# Patient Record
Sex: MALE | Race: WHITE | NOT HISPANIC OR LATINO | URBAN - METROPOLITAN AREA
[De-identification: names, ages, dates, MRNs, and addresses within clinical notes are randomized per-mention and may not be internally consistent; named-entity substitution may affect disease eponyms.]

---

## 2019-04-05 ENCOUNTER — HOSPITAL ENCOUNTER (INPATIENT)
Facility: CLINIC | Age: 29
LOS: 17 days | Discharge: PSYCHIATRIC OR SUBSTANCE USE FACILITY | End: 2019-04-22
Attending: PSYCHIATRY & NEUROLOGY | Admitting: PSYCHIATRY & NEUROLOGY
Payer: COMMERCIAL

## 2019-04-05 ENCOUNTER — TRANSFERRED RECORDS (OUTPATIENT)
Dept: HEALTH INFORMATION MANAGEMENT | Facility: CLINIC | Age: 29
End: 2019-04-05

## 2019-04-05 DIAGNOSIS — F31.9 BIPOLAR I DISORDER (H): ICD-10-CM

## 2019-04-05 DIAGNOSIS — F41.9 ANXIETY: ICD-10-CM

## 2019-04-05 DIAGNOSIS — B19.20 HEPATITIS C VIRUS INFECTION WITHOUT HEPATIC COMA, UNSPECIFIED CHRONICITY: Primary | ICD-10-CM

## 2019-04-05 PROBLEM — F29 PSYCHOSIS (H): Status: ACTIVE | Noted: 2019-04-05

## 2019-04-05 PROCEDURE — 12400001 ZZH R&B MH UMMC

## 2019-04-05 RX ORDER — OLANZAPINE 10 MG/1
10 TABLET ORAL
Status: DISCONTINUED | OUTPATIENT
Start: 2019-04-05 | End: 2019-04-06

## 2019-04-05 RX ORDER — TRAZODONE HYDROCHLORIDE 50 MG/1
50 TABLET, FILM COATED ORAL
Status: DISCONTINUED | OUTPATIENT
Start: 2019-04-05 | End: 2019-04-22 | Stop reason: HOSPADM

## 2019-04-05 RX ORDER — ALUMINA, MAGNESIA, AND SIMETHICONE 2400; 2400; 240 MG/30ML; MG/30ML; MG/30ML
30 SUSPENSION ORAL EVERY 4 HOURS PRN
Status: DISCONTINUED | OUTPATIENT
Start: 2019-04-05 | End: 2019-04-22 | Stop reason: HOSPADM

## 2019-04-05 RX ORDER — POLYETHYLENE GLYCOL 3350 17 G
2-4 POWDER IN PACKET (EA) ORAL
Status: DISCONTINUED | OUTPATIENT
Start: 2019-04-05 | End: 2019-04-16 | Stop reason: ALTCHOICE

## 2019-04-05 RX ORDER — BISACODYL 10 MG
10 SUPPOSITORY, RECTAL RECTAL DAILY PRN
Status: DISCONTINUED | OUTPATIENT
Start: 2019-04-05 | End: 2019-04-22 | Stop reason: HOSPADM

## 2019-04-05 RX ORDER — ACETAMINOPHEN 325 MG/1
650 TABLET ORAL EVERY 4 HOURS PRN
Status: DISCONTINUED | OUTPATIENT
Start: 2019-04-05 | End: 2019-04-22 | Stop reason: HOSPADM

## 2019-04-05 RX ORDER — OLANZAPINE 10 MG/2ML
10 INJECTION, POWDER, FOR SOLUTION INTRAMUSCULAR
Status: DISCONTINUED | OUTPATIENT
Start: 2019-04-05 | End: 2019-04-06

## 2019-04-05 RX ORDER — HYDROXYZINE HYDROCHLORIDE 25 MG/1
25 TABLET, FILM COATED ORAL EVERY 4 HOURS PRN
Status: DISCONTINUED | OUTPATIENT
Start: 2019-04-05 | End: 2019-04-22 | Stop reason: HOSPADM

## 2019-04-05 ASSESSMENT — MIFFLIN-ST. JEOR: SCORE: 1712.42

## 2019-04-05 ASSESSMENT — ACTIVITIES OF DAILY LIVING (ADL)
HYGIENE/GROOMING: INDEPENDENT
ORAL_HYGIENE: INDEPENDENT
LAUNDRY: UNABLE TO COMPLETE
DRESS: INDEPENDENT

## 2019-04-06 PROCEDURE — 99221 1ST HOSP IP/OBS SF/LOW 40: CPT | Mod: AI | Performed by: CLINICAL NURSE SPECIALIST

## 2019-04-06 PROCEDURE — 99207 ZZC CDG-HISTORY COMP: MEETS EXP. PROBLEM FOCUSED-DOWN CODED LACK OF ROS: CPT | Performed by: CLINICAL NURSE SPECIALIST

## 2019-04-06 PROCEDURE — 12400001 ZZH R&B MH UMMC

## 2019-04-06 RX ORDER — OLANZAPINE 10 MG/2ML
10 INJECTION, POWDER, FOR SOLUTION INTRAMUSCULAR
Status: DISCONTINUED | OUTPATIENT
Start: 2019-04-06 | End: 2019-04-06

## 2019-04-06 RX ORDER — OLANZAPINE 5 MG/1
5-10 TABLET ORAL 3 TIMES DAILY PRN
Status: DISCONTINUED | OUTPATIENT
Start: 2019-04-06 | End: 2019-04-22 | Stop reason: HOSPADM

## 2019-04-06 RX ORDER — OLANZAPINE 10 MG/2ML
10 INJECTION, POWDER, FOR SOLUTION INTRAMUSCULAR 3 TIMES DAILY PRN
Status: DISCONTINUED | OUTPATIENT
Start: 2019-04-06 | End: 2019-04-22 | Stop reason: HOSPADM

## 2019-04-06 RX ORDER — OLANZAPINE 5 MG/1
5-10 TABLET ORAL 3 TIMES DAILY PRN
Status: DISCONTINUED | OUTPATIENT
Start: 2019-04-06 | End: 2019-04-06

## 2019-04-06 ASSESSMENT — ACTIVITIES OF DAILY LIVING (ADL)
LAUNDRY: UNABLE TO COMPLETE
ORAL_HYGIENE: INDEPENDENT
HYGIENE/GROOMING: INDEPENDENT
DRESS: INDEPENDENT

## 2019-04-06 NOTE — PLAN OF CARE
Tani has been sleeping this shift. This writer has attempted to speak with him but patient hasn't gotten out of bed. Tani did eat dinner and returned to bed.Denies SI/SIB/AH/VH. Just wants to sleep.

## 2019-04-06 NOTE — PROGRESS NOTES
04/05/19 2242   Patient Belongings   Did you bring any home meds/supplements to the hospital?  No   Patient Belongings locker   Patient Belongings Put in Hospital Secure Location (Security or Locker, etc.) clothing;shoes;other (see comments)   Second Staff Hany Montoya Health Insurance card and Flat-bill Cap     In Locker:    2 tops, 1 bottom, Sandalsa, Flat-bill hat and the pt's Health Insurance card    A               Admission:  I am responsible for any personal items that are not sent to the safe or pharmacy.  [facility] is not responsible for loss, theft or damage of any property in my possession.    Signature:  _________________________________ Date: _______  Time: _____                                              Staff Signature:  ____________________________ Date: ________  Time: _____      2nd Staff person, if patient is unable/unwilling to sign:    Signature: ________________________________ Date: ________  Time: _____     Discharge:  [facility] has returned all of my personal belongings:    Signature: _________________________________ Date: ________  Time: _____                                          Staff Signature:  ____________________________ Date: ________  Time: _____
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
## 2019-04-06 NOTE — PROGRESS NOTES
Initial Psychosocial Assessment    I have reviewed the chart, met with the patient, and developed Care Plan.  Information for assessment was obtained from: Medical Chart    Writer attempted to meet with patient at his bedside.  He declines to participate in interview saying he is too somnolent.  Information obtained from medical chart.      Presenting Problem:  Admitted on a 72 hour hold to [facility] on 4/5/19 due to sx of mauri  utox in ED POS for Amphetamine and Cannibis    History of Mental Health and Chemical Dependency:  hx of alcohol abuse, ADD, and depression     Family Description ([facility], Family Psychiatric History):  He was brought to the ED by his father    Significant Life Events (Illness, Abuse, Trauma, Death):  Not able to assess    Living Situation:  [facility] in [place]    Educational Background:  Not able to assess    Occupational History:  Not able to assess    Financial Status:  Income: Not able to assess  Insurance: Does not appear to have insurance    Legal Issues:  72 Hour Hold Begin Date: 4/5/2019    72 Hour Hold Begin Time: 7:30 PM    72 Hour Hold End Date: 4/10/2019    72 Hour Hold Time End: 7:30 PM      Ethnic/Cultural Issues:  28 year old  male    Spiritual Orientation:  Not able to assess     Service History:  Not able to assess    Social Functioning (organization, interests):  Not able to assess    Current Treatment Providers are:  Not able to assess    Social Service Assessment/Plan:  Patient will have psychiatric assessment and medication management by the psychiatrist. Medications will be reviewed and adjusted per MD as indicated. The treatment team will continue to assess and stabilize the patient's mental health symptoms with the use of medications and therapeutic programming. Hospital staff will provide a safe environment and a therapeutic milieu. Staff will continue to assess patient as needed. Patient will participate in unit groups and  activities. Patient will receive individual and group support on the unit.     CTC will do individual inpatient treatment planning and after care planning. CTC will discuss options for increasing community supports with the patient. CTC will coordinate with outpatient providers and will place referrals to ensure appropriate follow up care is in place.

## 2019-04-06 NOTE — PLAN OF CARE
"Pt presents to 30N with symptoms of psychosis. Patient has a known history of Alcohol Abuse, ADD and depression whom was brought into [facility] ED by his Father for a Mental Health Check. He reported at that time having suicidal thoughts and not wanting to live any longer. He denies this at this time and reports \"I only want to sleep.\" Tani reported he had one prior hospitalization for \"Mental illness twenty nine years ago.\" Urine tox screen positive for Amphetamines and Marijuana. Patient unable to cooperate with interview process at this time secondary to being unable to stay awake. Tani did report last using Meth a \"Few days ago.\"    Medical: Unremarkable    Plan: Provide for Safety,  Monitor and Observe Mood and Behavior, Encourage Medication compliance, Develop Trust.  "

## 2019-04-07 LAB
ALBUMIN SERPL-MCNC: 3.9 G/DL (ref 3.4–5)
ALP SERPL-CCNC: 58 U/L (ref 40–150)
ALT SERPL W P-5'-P-CCNC: 160 U/L (ref 0–70)
ANION GAP SERPL CALCULATED.3IONS-SCNC: 7 MMOL/L (ref 3–14)
AST SERPL W P-5'-P-CCNC: 50 U/L (ref 0–45)
BILIRUB SERPL-MCNC: 0.5 MG/DL (ref 0.2–1.3)
BUN SERPL-MCNC: 16 MG/DL (ref 7–30)
CALCIUM SERPL-MCNC: 9.2 MG/DL (ref 8.5–10.1)
CHLORIDE SERPL-SCNC: 104 MMOL/L (ref 94–109)
CO2 SERPL-SCNC: 30 MMOL/L (ref 20–32)
CREAT SERPL-MCNC: 0.84 MG/DL (ref 0.66–1.25)
ERYTHROCYTE [DISTWIDTH] IN BLOOD BY AUTOMATED COUNT: 12.9 % (ref 10–15)
GFR SERPL CREATININE-BSD FRML MDRD: >90 ML/MIN/{1.73_M2}
GLUCOSE SERPL-MCNC: 70 MG/DL (ref 70–99)
HCT VFR BLD AUTO: 51 % (ref 40–53)
HGB BLD-MCNC: 16.6 G/DL (ref 13.3–17.7)
MCH RBC QN AUTO: 31.8 PG (ref 26.5–33)
MCHC RBC AUTO-ENTMCNC: 32.5 G/DL (ref 31.5–36.5)
MCV RBC AUTO: 98 FL (ref 78–100)
PLATELET # BLD AUTO: 270 10E9/L (ref 150–450)
POTASSIUM SERPL-SCNC: 4.7 MMOL/L (ref 3.4–5.3)
PROT SERPL-MCNC: 7.6 G/DL (ref 6.8–8.8)
RBC # BLD AUTO: 5.22 10E12/L (ref 4.4–5.9)
SODIUM SERPL-SCNC: 141 MMOL/L (ref 133–144)
WBC # BLD AUTO: 7.8 10E9/L (ref 4–11)

## 2019-04-07 PROCEDURE — 0064U ANTB TP TOTAL&RPR IA QUAL: CPT | Performed by: PHYSICIAN ASSISTANT

## 2019-04-07 PROCEDURE — 87340 HEPATITIS B SURFACE AG IA: CPT | Performed by: PHYSICIAN ASSISTANT

## 2019-04-07 PROCEDURE — 36415 COLL VENOUS BLD VENIPUNCTURE: CPT | Performed by: PHYSICIAN ASSISTANT

## 2019-04-07 PROCEDURE — 99207 ZZC CDG-HISTORY COMP: MEETS EXP. PROBLEM FOCUSED - DOWN CODED LACK OF HPI: CPT | Performed by: PHYSICIAN ASSISTANT

## 2019-04-07 PROCEDURE — 87491 CHLMYD TRACH DNA AMP PROBE: CPT | Performed by: PHYSICIAN ASSISTANT

## 2019-04-07 PROCEDURE — 25000132 ZZH RX MED GY IP 250 OP 250 PS 637: Performed by: PSYCHIATRY & NEUROLOGY

## 2019-04-07 PROCEDURE — 12400001 ZZH R&B MH UMMC

## 2019-04-07 PROCEDURE — 87591 N.GONORRHOEAE DNA AMP PROB: CPT | Performed by: PHYSICIAN ASSISTANT

## 2019-04-07 PROCEDURE — 99231 SBSQ HOSP IP/OBS SF/LOW 25: CPT | Performed by: PHYSICIAN ASSISTANT

## 2019-04-07 PROCEDURE — 87522 HEPATITIS C REVRS TRNSCRPJ: CPT | Performed by: PHYSICIAN ASSISTANT

## 2019-04-07 PROCEDURE — 86706 HEP B SURFACE ANTIBODY: CPT | Performed by: PHYSICIAN ASSISTANT

## 2019-04-07 PROCEDURE — 87389 HIV-1 AG W/HIV-1&-2 AB AG IA: CPT | Performed by: PHYSICIAN ASSISTANT

## 2019-04-07 PROCEDURE — H2032 ACTIVITY THERAPY, PER 15 MIN: HCPCS

## 2019-04-07 PROCEDURE — 80053 COMPREHEN METABOLIC PANEL: CPT | Performed by: PHYSICIAN ASSISTANT

## 2019-04-07 PROCEDURE — 85027 COMPLETE CBC AUTOMATED: CPT | Performed by: PHYSICIAN ASSISTANT

## 2019-04-07 PROCEDURE — 86803 HEPATITIS C AB TEST: CPT | Performed by: PHYSICIAN ASSISTANT

## 2019-04-07 RX ADMIN — NICOTINE POLACRILEX 2 MG: 2 LOZENGE ORAL at 09:51

## 2019-04-07 RX ADMIN — NICOTINE POLACRILEX 4 MG: 2 LOZENGE ORAL at 21:19

## 2019-04-07 ASSESSMENT — ACTIVITIES OF DAILY LIVING (ADL)
HYGIENE/GROOMING: HANDWASHING;SHOWER;INDEPENDENT
DRESS: SCRUBS (BEHAVIORAL HEALTH);INDEPENDENT
HYGIENE/GROOMING: INDEPENDENT
LAUNDRY: WITH SUPERVISION
LAUNDRY: WITH SUPERVISION
DRESS: INDEPENDENT
ORAL_HYGIENE: INDEPENDENT
ORAL_HYGIENE: INDEPENDENT

## 2019-04-07 NOTE — CONSULTS
[facility], [place]  Consult Note - Hospitalist Service       Date of Admission: 4/5/2019  Consult Requested by: Debra Naegele  Reason for Consult: Admit from OSH    Assessment & Plan   Tani Ramirez is a 28 year old male with a history of unspecified psychosis, ADD, generalized anxiety disorder, and polysubstance abuse who was admitted to station 30 from OSH on 4/5/19 with psychosis.    Psychosis, Polysubstance Abuse, JESS, ADD - Not on any PTA medications. Hx of alcohol and methamphetamine abuse per chart review, however, patient denies recent use of alcohol or illicit drugs on today's exam. Utox from OSH + for amphetamines and THC on 4/5/19. BAL from OSH <3 on 4/5/19.  - Management per Psychiatry.     Mild Transaminitis - , AST 50 on 4/7/19. TBili and Alk Phos wnl. No prior values available for comparison. Asymptomatic. Unclear etiology, possibly related to hx of alcohol abuse though not elevated in typical 2:1 pattern. DDx includes viral hepatitis in setting of substance abuse.  - Screen for Hepatitis B and C as below  - Trend Hepatic Panel on 4/8    STI Testing - Notified by RN staff that patient requesting STI testing.  - HIV, Hepatitis B, Hepatitis C, Treponema Ab, and Urine G/C PCR ordered.    Medicine will follow up on results of STI testing and repeat hepatic panel. Please page the on-call [facility] for any intercurrent medical issues which arise.    Mercedes Pichardo PA-C  Hospitalist Service  [phone]  _________________________________________________________________    Chief Complaint   Psychosis    History is obtained from the patient and electronic health record    History of Present Illness   Tani Ramirez is a 28 year old male with a history of unspecified psychosis, ADD, generalized anxiety disorder, and polysubstance abuse who was admitted to station 30 from OSH on 4/5/19 with psychosis. History limited as patient is a poor historian. He reports his only current  "concern is when he is going to \"get out of here.\" Chart review indicates a history of alcohol abuse and methamphetamine abuse, however, patient denies recent use of alcohol or illicit drugs on today's interview. Denies any medical complaints including headaches, dizziness, URI symptoms, chest pain, SOB, n/v/c/d, abdominal pain, or dysuria.    Review of Systems   The 10 point Review of Systems is negative other than noted in the HPI or here.     Past Medical History    I have reviewed this patient's medical history and updated it with pertinent information if needed.   Past Medical History:   Diagnosis Date     ADD (attention deficit disorder)      Generalized anxiety disorder      Polysubstance abuse (H)     Alcohol, Methamphetamines     Psychosis, unspecified psychosis type (H)        Past Surgical History   I have reviewed this patient's surgical history and updated it with pertinent information if needed.  Past Surgical History:   Procedure Laterality Date     LAPAROSCOPIC PYLOROMYOTOMY CHILD         Social History   Daily nicotine use via eJuice. Requesting nicotine lozenges.  Reported hx of alcohol and methamphetamine abuse per chart review. Patient denies recent use of alcohol or illicit drugs on today's exam.   Utox from OSH + for amphetamines and THC. BAL from OSH <3.    Family History   Reviewed and unknown to patient.    Medications   No medications prior to admission.       Allergies   No Known Allergies    Physical Exam   Vital Signs: Temp: 98.3  F (36.8  C) Temp src: Oral BP: 96/56 Pulse: 70            Weight: 161 lbs 0 oz     General: Lying in bed. Appears comfortable and non-toxic. NAD.  CV: RRR. No appreciable murmurs.  Respiratory: Normal effort on RA. Lungs CTAB without wheezes, rales, or rhonchi.  GI: Soft, non-distended, and non-tender with bowel sounds present.  Extremities: No peripheral edema. Warm and well perfused.  Neuro: Alert. Moves all extremities. Grossly non-focal.  Skin: No rashes or " jaundice on exposed areas of skin.    Data    CMP from 4/7/19 significant for , AST 50.  CBC WNL on 4/7/19.  Utox from OSH on 4/5/19 positive for amphetaines and THC.   BAL from OSH on 4/5/19 <3.

## 2019-04-07 NOTE — PROGRESS NOTES
patient spent most of the shift in his room, either sleeping or resting. He denies any SI or SIB, as well as insisting he needs to leave the hospital soon. That said, his thinking appears quite paranoid, with the patient thinking staff and other patient's are mentioning other medications and keeping his blood type from him for a suspicious reason.       04/07/19 1500   Behavioral Health   Hallucinations denies / not responding to hallucinations   Thinking paranoid;delusional   Orientation person: oriented;place: oriented;date: oriented;time: oriented   Memory baseline memory   Insight poor   Judgement impaired   Eye Contact at examiner   Affect full range affect   Mood anxious;mood is calm   Physical Appearance/Attire appears stated age;attire appropriate to age and situation   Hygiene well groomed   1. Wish to be Dead No   2. Non-Specific Active Suicidal Thoughts  No   Activity withdrawn   Speech clear;coherent   Medication Sensitivity no stated side effects;no observed side effects   Psychomotor / Gait balanced;steady   Psycho Education   Type of Intervention 1:1 intervention   Response participates, initiates socially appropriate   Hours 0.5   Activities of Daily Living   Hygiene/Grooming independent   Oral Hygiene independent   Dress independent   Laundry with supervision   Room Organization independent

## 2019-04-08 LAB
C TRACH DNA SPEC QL NAA+PROBE: NEGATIVE
HBV SURFACE AB SERPL IA-ACNC: 7.35 M[IU]/ML
HBV SURFACE AG SERPL QL IA: NONREACTIVE
HCV AB SERPL QL IA: REACTIVE
HIV 1+2 AB+HIV1 P24 AG SERPL QL IA: NONREACTIVE
N GONORRHOEA DNA SPEC QL NAA+PROBE: NEGATIVE
SPECIMEN SOURCE: NORMAL
SPECIMEN SOURCE: NORMAL
T PALLIDUM AB SER QL: NONREACTIVE

## 2019-04-08 PROCEDURE — 25000132 ZZH RX MED GY IP 250 OP 250 PS 637: Performed by: PSYCHIATRY & NEUROLOGY

## 2019-04-08 PROCEDURE — 12400001 ZZH R&B MH UMMC

## 2019-04-08 PROCEDURE — 99232 SBSQ HOSP IP/OBS MODERATE 35: CPT | Performed by: PSYCHIATRY & NEUROLOGY

## 2019-04-08 RX ORDER — GABAPENTIN 100 MG/1
100 CAPSULE ORAL 3 TIMES DAILY
Status: DISCONTINUED | OUTPATIENT
Start: 2019-04-08 | End: 2019-04-22 | Stop reason: HOSPADM

## 2019-04-08 RX ADMIN — GABAPENTIN 100 MG: 100 CAPSULE ORAL at 17:48

## 2019-04-08 RX ADMIN — NICOTINE POLACRILEX 4 MG: 2 LOZENGE ORAL at 14:29

## 2019-04-08 ASSESSMENT — ACTIVITIES OF DAILY LIVING (ADL)
HYGIENE/GROOMING: INDEPENDENT
DRESS: INDEPENDENT
ORAL_HYGIENE: INDEPENDENT

## 2019-04-08 NOTE — PROGRESS NOTES
Brief medicine follow up note:  - Met with pt today to discuss positive hepatitis C anne. Denies hx of Hep C and denies prior exposure but states he has hx of IVDU but not for some time. Thinks his GF still uses IV drugs. Denies fever, chills, abd pain, nausea. Explained to him that he needs follow up confirmatory testing and he is agreeable to obtain this today. If + for viral load, discussed the importance of him needing OP GI follow up in clinic for further testing and to discuss potential treatment. Medicine will follow up on today's HCV RNA quant. Please feel free to call with questions.       Govind Augustin PA-C  Internal Medicine Hospitalist   [facility] Hospitalist group  [phone]

## 2019-04-08 NOTE — PROGRESS NOTES
Team has decided to petition for MI-CD commitment.  I called [facility] Prepetition Screening - Ph: [phone] Fax: [phone]  And they will assign the case.  I stated the paperwork will be ready by tomorrow morning.

## 2019-04-08 NOTE — PROGRESS NOTES
Pt was active in milieu and appropriate with staff and peers. Pt reported feeling depressed yesterday but feels great today. Pt denies SI, SIB, and all other psychotic symptoms.          04/07/19 2247   Behavioral Health   Hallucinations denies / not responding to hallucinations   Thinking distractable   Orientation person: oriented;place: oriented;date: oriented;time: oriented   Memory baseline memory   Insight admits / accepts   Judgement intact   Eye Contact at examiner   Affect blunted, flat   Mood mood is calm   Physical Appearance/Attire attire appropriate to age and situation   Hygiene well groomed   Suicidality other (see comments)  (Denies)   1. Wish to be Dead No   2. Non-Specific Active Suicidal Thoughts  No   3. Active Sucidal Ideation with any Methods (Not Plan) Without Intent to Act  No   4. Active Suicidal Ideation with Some Intent to Act, Without Specific Plan  No   5. Active Suicidal Ideation with Specific Plan and Intent  No   Self Injury other (see comment)  (None Observed)   Activity other (see comment)  (Active in milieu)   Speech clear;coherent   Medication Sensitivity no stated side effects;no observed side effects   Psychomotor / Gait balanced;steady   Coping/Psychosocial   Verbalized Emotional State acceptance   Activities of Daily Living   Hygiene/Grooming handwashing;shower;independent   Oral Hygiene independent   Dress scrubs (behavioral health);independent   Laundry with supervision   Room Organization independent

## 2019-04-08 NOTE — PLAN OF CARE
"Tani father stopped at the desk and wishes to inform staff that \"Tani is saying some really strange things.\" Dad stated \"he has been calling home over the weekend to my wife and I and is making crazy statements about \"[employer]\". Dad would like to speak to the physician. Dad and his wife \"Don't want to take him home as he is right now.\" Dad is not convinced that Tani is that much better from when he took him into the ER in [place].  MAHI for Father (Tom Powers) signed by patient.   "
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
## 2019-04-09 PROCEDURE — 12400001 ZZH R&B MH UMMC

## 2019-04-09 PROCEDURE — 99232 SBSQ HOSP IP/OBS MODERATE 35: CPT | Performed by: PSYCHIATRY & NEUROLOGY

## 2019-04-09 PROCEDURE — 25000132 ZZH RX MED GY IP 250 OP 250 PS 637: Performed by: PSYCHIATRY & NEUROLOGY

## 2019-04-09 RX ADMIN — NICOTINE POLACRILEX 4 MG: 2 LOZENGE ORAL at 11:03

## 2019-04-09 RX ADMIN — GABAPENTIN 100 MG: 100 CAPSULE ORAL at 20:00

## 2019-04-09 RX ADMIN — GABAPENTIN 100 MG: 100 CAPSULE ORAL at 14:01

## 2019-04-09 RX ADMIN — GABAPENTIN 100 MG: 100 CAPSULE ORAL at 08:49

## 2019-04-09 ASSESSMENT — MIFFLIN-ST. JEOR: SCORE: 1726.03

## 2019-04-09 NOTE — PROGRESS NOTES
Pt was in a good mood this evening. He spent his time watching movies and socializing with peers. He was calm and respectful. He admits that he was having delusions when he was admitted to the hospital, though he doesn't feel like he needs to be here. He denies any anxiety, depression, SI, SIB, or hallucinations. His goal was to call his parents so he can get help straightening out the story of how he was admitted. He doesn't feel like the medication he was given here has done anything for him except to make him sleepy.     04/08/19 2200   Behavioral Health   Hallucinations denies / not responding to hallucinations   Thinking distractable   Orientation time: oriented;date: oriented;person: oriented;place: oriented   Memory baseline memory   Insight admits / accepts   Judgement intact   Eye Contact at examiner   Affect full range affect   Mood mood is calm   Physical Appearance/Attire appears stated age;attire appropriate to age and situation   Hygiene well groomed   Suicidality other (see comments)  (denies)   1. Wish to be Dead No   2. Non-Specific Active Suicidal Thoughts  No   Self Injury other (see comment)  (denies)   Elopement   (none)   Activity other (see comment)  (visible in milieu, social with peers)   Speech clear;coherent   Medication Sensitivity no stated side effects;no observed side effects   Psychomotor / Gait balanced;steady   Activities of Daily Living   Hygiene/Grooming independent   Oral Hygiene independent   Dress independent   Room Organization independent

## 2019-04-09 NOTE — PROGRESS NOTES
[facility], [place]   Psychiatric Progress Note        Interim History:   The patient's care was discussed with the treatment team during the daily team meeting and/or staff's chart notes were reviewed.  Staff report: No acute issues.  Slept well.  Eating meals.  Taking medications. No aggression or hostility.    On examination today, the patient had no complaints other than feeling confined on the unit.  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  He feels as though continuing his hospital stay is simply wasting time.  He has had some time to think about how to be successful with his goals which he explains to me today.  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  Afterwards, he can find employment and hopefully save enough money for independent housing.    He reflected on knowing a person who is receiving $800 a month through disability benefits for diagnosis of bipolar disorder.  He is saddened to see that person spend money on drugs and gambling.  He explains that if he were to have a stable income similar to that, he could be much more productive with the way he spends the money and use it to establish independence and stable housing.  He questions whether he may have a diagnosis of bipolar disorder.  He tells me that his mother has that diagnosis and he has noticed occasional instability in his mood.  He inquired as to how he may go about applying for disability or being assessed for that diagnosis.    Patient denied auditory and visual hallucinations.  He slept well last night and did not report vegetative symptoms.  He denied feeling helpless or hopeless.  Denied anhedonia.    No side effects reported to gabapentin.    Overall he seemed more organized and future oriented today.  He did not express any paranoid concerns.            "Medications:       gabapentin  100 mg Oral TID          Allergies:   No Known Allergies       Labs:   No results found for this or any previous visit (from the past 24 hour(s)).       Psychiatric Examination:     /70 (BP Location: Left arm)   Pulse 85   Temp 96.7  F (35.9  C) (Tympanic)   Resp 16   Ht 1.803 m (5' 11\")   Wt 74.4 kg (164 lb)   BMI 22.87 kg/m    Weight is 164 lbs 0 oz  Body mass index is 22.87 kg/m .  Orthostatic Vitals       Most Recent      Sitting Orthostatic /81 04/07 1616    Sitting Orthostatic Pulse (bpm) 83 04/07 1616            Appearance: awake, alert  Attitude:  cooperative  Eye Contact:  better  Mood:  better  Affect:  appropriate and in normal range  Speech:  clear, coherent  Psychomotor Behavior:  no evidence of tardive dyskinesia, dystonia, or tics  Throught Process:  logical and linear  Associations:  no loose associations  Thought Content:  no evidence of suicidal ideation or homicidal ideation and no evidence of psychotic thought  Insight:  limited  Judgement:  fair  Oriented to:  time, person, and place  Attention Span and Concentration:  intact  Recent and Remote Memory:  fair    Clinical Global Impressions  First:  Considering your total clinical experience with this particular patient population, how severe are the patient's symptoms at this time?: 7 (04/08/19 1253)  Compared to the patient's condition at the START of treatment, this patient's condition is:: 3 (04/08/19 1253)  Most recent:  Considering your total clinical experience with this particular patient population, how severe are the patient's symptoms at this time?: 7 (04/08/19 1253)  Compared to the patient's condition at the START of treatment, this patient's condition is:: 3 (04/08/19 1253)         Precautions:     Behavioral Orders   Procedures     Code 1 - Restrict to Unit     [place]     MMPI 2     Routine Programming     As clinically indicated     Status 15     Every 15 minutes.     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  His presentation seems to reflect a substance-induced psychotic disorder stemming from methamphetamine and/or cannabis use.  Unfortunately, he has not interested in formal treatment however is beginning to appreciate the need for sobriety.  Continue to utilize motivational interviewing in hopes of gaining cooperation with a referral for MI CD treatment.    Hepatitis C: New diagnosis.  He met with internal medicine yesterday and they outlined a plan to pursue further evaluation and treatment however he would need 60 days of sobriety before qualifying for treatment.  The patient is aware of this which seem to motivate his desire for sobriety.    Psychosocial treatments to be addressed with social work consult and groups.  As previously noted, I spoke with his father yesterday but expressed safety concerns in the context of the risk of a relapse outside of a structured setting.    Psychology consultation requested today for neuropsychological testing to further evaluate personality characteristics and the possibility of a bipolar disorder.    Legal status: On a 72-hour hold.  Petition for commitment was submitted yesterday and we are awaiting prepetition screening.    Disposition: To be determined.  The patient is refusing residential treatment options.  His father tells me that he is not welcome back home unless he completes treatment  first to ensure sobriety.

## 2019-04-09 NOTE — PHARMACY-ADMISSION MEDICATION HISTORY
Admission Medication History status for the 4/5/2019 admission is complete.  See EPIC admission navigator for Prior to Admission medications.    Medication history interview sources:  Patient     Medication history source reliability: Good    Patient reports taking no prescription or over-the-counter medications    Medication history completed by: Chris Keen, Pharmacy Intern    Prior to Admission medications    Not on File

## 2019-04-09 NOTE — PROGRESS NOTES
"  I have faxed the petition paperwork to [facility].  Pt is assigned to Waylon HAGAN @ [phone]. I had not heard from her but did leave her a vm asking when she would be here.    Pt approached the desk and seem to be doing an MMPI. He asked me what \" I usually get a raw deal in life\" meant.  I explained and he seemed to understand.    Pt was seen by Waylon late afternoon for the screening.  "

## 2019-04-09 NOTE — PLAN OF CARE
Patient is calm and speaks clearly. Patient does not mention any drug use connected with his delusional thinking. States that he feels that  Neurontin is helping with his anxiety. Denies any suicidal ideation. Worked on MMPI for the majority of the shift.

## 2019-04-10 PROCEDURE — 99232 SBSQ HOSP IP/OBS MODERATE 35: CPT | Performed by: PSYCHIATRY & NEUROLOGY

## 2019-04-10 PROCEDURE — 12400001 ZZH R&B MH UMMC

## 2019-04-10 PROCEDURE — 25000132 ZZH RX MED GY IP 250 OP 250 PS 637: Performed by: PSYCHIATRY & NEUROLOGY

## 2019-04-10 RX ADMIN — GABAPENTIN 100 MG: 100 CAPSULE ORAL at 14:31

## 2019-04-10 RX ADMIN — NICOTINE POLACRILEX 4 MG: 2 LOZENGE ORAL at 09:18

## 2019-04-10 RX ADMIN — GABAPENTIN 100 MG: 100 CAPSULE ORAL at 08:42

## 2019-04-10 RX ADMIN — GABAPENTIN 100 MG: 100 CAPSULE ORAL at 20:34

## 2019-04-10 ASSESSMENT — ACTIVITIES OF DAILY LIVING (ADL)
HYGIENE/GROOMING: INDEPENDENT
HYGIENE/GROOMING: INDEPENDENT
DRESS: INDEPENDENT
LAUNDRY: WITH SUPERVISION
ORAL_HYGIENE: INDEPENDENT
DRESS: INDEPENDENT
ORAL_HYGIENE: INDEPENDENT

## 2019-04-10 NOTE — PROGRESS NOTES
"Patient intermittently visible and active in the milieu today.  Peer interaction spontaneous and respectful.  No observed program involvement.  Presented generally as alert, calm, focused, and coherent.  Affect full range and stable. Mood stable.  Responsive to staff inquiries and cooperative with interventions and protocols.  Endorsed moderate to high \"situational\" anxiety (concern about his job status and girlfriend), but denied depression, SI, SIB urges, AH's, and cognitive slowing, concentration issues, CD issues, etc.   Patient completed some neuropsych testing this AM in preparation for potential commitment proceedings.  Patient is hopeful of stabilizing here in the hospital and being released to his parents home where he would then work on his career development.  Presented o behavioral management issues this shift.   Jamie Hogan   04/10/2019     04/10/19 1208   Sleep/Rest/Relaxation   Day/Evening Time Hours resting in bed   Cognitive   Follows Commands yes   Speech clear;spontaneous   Best Language 0 - No aphasia   Mood/Behavior anxious   Behavioral Health   Hallucinations denies / not responding to hallucinations   Thinking intact   Orientation person: oriented;place: oriented;date: oriented;time: oriented   Memory baseline memory   Insight admits / accepts;insight appropriate to situation   Judgement impaired   Eye Contact at examiner   Affect full range affect   Mood anxious   Physical Appearance/Attire appears stated age;attire appropriate to age and situation;neat   Hygiene well groomed   Suicidality other (see comments)  (denied SI)   1. Wish to be Dead No   2. Non-Specific Active Suicidal Thoughts  No   Self Injury other (see comment)  (denied SIB urges)   Elopement   (no indicators)   Activity other (see comment)  (intermittently visible and engaged in milieu)   Speech clear;coherent   Psychomotor / Gait balanced;steady   Psycho Education   Type of Intervention 1:1 intervention   Response " participates with encouragement   Hours 0.5   Treatment Detail current MH status   Safety   Suicidality Status 15   Violence Risk   Feels Like Hurting Others no   Activities of Daily Living   Hygiene/Grooming independent   Oral Hygiene independent   Dress independent   Room Organization independent   Groups   Details no group engagement

## 2019-04-10 NOTE — PROGRESS NOTES
Waylon ([phone]) from [facility] called and left a vm. She said that they supported the petition but only the MI part, they did not support the CD portion.    We can expect a court hold by the end of the day.    Jena from [facility] called to say that the pt is on a court hold as of 4:02PM. She is faxing the hard copy.  I asked MD to place order for the court hold. I informed RN.
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
## 2019-04-10 NOTE — PROGRESS NOTES
"Pt was visible and social within the milieu and spent a good amount of time in his room talking to his roommate. At one point they were shouting and possibly arguing. When this writer checked in and asked if everything was ok, both pts responded, \"yes\". Pt attended AA meeting and did shower this shift. Upon check in, pt appeared very nervous and stated he was hoping he did well on his test(mmpi) as he believes that is going to be the deciding factor in his discharge. Pt rated his depression at 0 and anxiety at a 7.Pt stated his ideal discharge is to return to his parents home, finish his last semester of college and get his license back. Pt also states he is worried about a girl he recently met that uses meth and has her own home. Pt stated he can be there for her since he will not use and she respects him because he wont allow her to shoot up. Pt did not appear to have any insight into his substance issues and believes his major problems are due to life circumstances and he should just go back to the life he lived prior. Pt also stated he is nervous about his diagnosis of HEP C and is still processing the new information.      04/09/19 2200   Behavioral Health   Hallucinations denies / not responding to hallucinations   Thinking intact   Orientation person: oriented;place: oriented;date: oriented;time: oriented   Memory baseline memory   Insight poor   Judgement impaired   Affect blunted, flat   Mood mood is calm   Physical Appearance/Attire attire appropriate to age and situation   Hygiene well groomed   1. Wish to be Dead No   2. Non-Specific Active Suicidal Thoughts  No     "

## 2019-04-11 LAB
HCV RNA SERPL NAA+PROBE-ACNC: ABNORMAL [IU]/ML
HCV RNA SERPL NAA+PROBE-LOG IU: 4.5 LOG IU/ML

## 2019-04-11 PROCEDURE — 99232 SBSQ HOSP IP/OBS MODERATE 35: CPT | Performed by: PSYCHIATRY & NEUROLOGY

## 2019-04-11 PROCEDURE — 25000132 ZZH RX MED GY IP 250 OP 250 PS 637: Performed by: PSYCHIATRY & NEUROLOGY

## 2019-04-11 PROCEDURE — 12400001 ZZH R&B MH UMMC

## 2019-04-11 RX ORDER — ARIPIPRAZOLE 5 MG/1
5 TABLET ORAL DAILY
Status: DISCONTINUED | OUTPATIENT
Start: 2019-04-11 | End: 2019-04-18

## 2019-04-11 RX ADMIN — NICOTINE POLACRILEX 4 MG: 2 LOZENGE ORAL at 21:01

## 2019-04-11 RX ADMIN — GABAPENTIN 100 MG: 100 CAPSULE ORAL at 08:44

## 2019-04-11 RX ADMIN — TRAZODONE HYDROCHLORIDE 50 MG: 50 TABLET ORAL at 22:55

## 2019-04-11 RX ADMIN — GABAPENTIN 100 MG: 100 CAPSULE ORAL at 14:04

## 2019-04-11 RX ADMIN — ARIPIPRAZOLE 5 MG: 5 TABLET ORAL at 14:04

## 2019-04-11 RX ADMIN — GABAPENTIN 100 MG: 100 CAPSULE ORAL at 21:00

## 2019-04-11 RX ADMIN — NICOTINE POLACRILEX 4 MG: 2 LOZENGE ORAL at 14:05

## 2019-04-11 ASSESSMENT — ACTIVITIES OF DAILY LIVING (ADL)
ORAL_HYGIENE: INDEPENDENT
HYGIENE/GROOMING: INDEPENDENT
DRESS: INDEPENDENT
ORAL_HYGIENE: INDEPENDENT
HYGIENE/GROOMING: HANDWASHING;SHOWER;INDEPENDENT
LAUNDRY: WITH SUPERVISION
DRESS: STREET CLOTHES;SCRUBS (BEHAVIORAL HEALTH);INDEPENDENT

## 2019-04-11 ASSESSMENT — MIFFLIN-ST. JEOR: SCORE: 1735.1

## 2019-04-11 NOTE — PROGRESS NOTES
Pt was served papers from the court today. I understand that he appeared surprised.    Since pt was not able to engage in a psychosocial assessment, it would behoove staff to read the prepetition report to understand this pt further.    Exam: April 15, 2019 at 3:30PM.  Hearing: April 18, 2019 - time TBD    Attoney: Dong Drake @[phone]

## 2019-04-11 NOTE — PROGRESS NOTES
[facility], [place]   Psychiatric Progress Note        Interim History:   The patient's care was discussed with the treatment team during the daily team meeting and/or staff's chart notes were reviewed.  Staff report: No acute issues.  Slept well.  Eating meals.  Taking medications. No aggression or hostility.    He reports completing the neuropsych testing.  He inquired as to why certain questions were asked and why he was asked to draw certain types of pictures.  He inquired regarding what the testing might be used for.  He has questions regarding what to expect next regarding his treatment.  As we discussed the decision for a court hold, the seem to prompt some agitation.  He inquired regarding the examination process which seemed to trigger some underlying paranoia.  He seemed to have difficulty accepting explanation.  Angered by the idea of a  being involved.  Angered by the idea of a  being involved.  In general, the entire examination portion seem to prompt a lot of paranoid concerns and anger.  In frustration, he terminated the interview.  I did overhear him in the pratt angrily yelling and cursing regarding the need to continue his hospitalization and be evaluated at a court hearing.    Prior to becoming upset, he did mention that he has noticed his mood fluctuates throughout the day.  He described moments of sadness, moments of irritability, and moments of happiness which seem to be unrelated to environmental triggers.    Patient denied auditory and visual hallucinations.  He slept well last night and did not report vegetative symptoms.  He denied feeling helpless or hopeless. Denied anhedonia.    No side effects reported to gabapentin.           Medications:       gabapentin  100 mg Oral TID          Allergies:   No Known Allergies       Labs:   No results found for this or any previous visit (from the past 24 hour(s)).       Psychiatric Examination:     /87    "Pulse 80   Temp 97.6  F (36.4  C) (Oral)   Resp 16   Ht 1.803 m (5' 11\")   Wt 74.4 kg (164 lb)   SpO2 97%   BMI 22.87 kg/m    Weight is 164 lbs 0 oz  Body mass index is 22.87 kg/m .  Orthostatic Vitals       Most Recent      Sitting Orthostatic /81 04/07 1616    Sitting Orthostatic Pulse (bpm) 83 04/07 1616            Appearance: awake, alert  Attitude:  cooperative  Eye Contact:  better  Mood:  angry and anxious  Affect:  labile and guarded  Speech:  pressured speech  Psychomotor Behavior:  no evidence of tardive dyskinesia, dystonia, or tics  Throught Process:  tangental  Associations:  no loose associations  Thought Content:  no evidence of suicidal ideation or homicidal ideation and There seemed to be a prominent theme of paranoid concerns.  Insight:  limited  Judgement:  limited  Oriented to:  time, person, and place  Attention Span and Concentration:  intact  Recent and Remote Memory:  fair    Clinical Global Impressions  First:  Considering your total clinical experience with this particular patient population, how severe are the patient's symptoms at this time?: 7 (04/08/19 1253)  Compared to the patient's condition at the START of treatment, this patient's condition is:: 3 (04/08/19 1253)  Most recent:  Considering your total clinical experience with this particular patient population, how severe are the patient's symptoms at this time?: 7 (04/08/19 1253)  Compared to the patient's condition at the START of treatment, this patient's condition is:: 3 (04/08/19 1253)         Precautions:     Behavioral Orders   Procedures     Code 1 - Restrict to Unit     [facility]     MMPI 2     Routine Programming     As clinically indicated     Status 15     Every 15 minutes.     Suicide precautions     Patients on Suicide Precautions should have a Combination Diet ordered that includes a Diet selection(s) AND a Behavioral Tray selection for Safe Tray - with utensils, or Safe Tray - NO utensils            " DIagnoses:      Suspect a substance-induced psychotic disorder (Methamphetamine and/or cannabis related)    -Rule out a primary psychotic illness or mood disorder  Unspecified anxiety disorder  Stimulant use disorder, amphetamine type substance, severe  Cannabis use disorder, moderate         Plan:     Mood symptoms may be coming more apparent.  Paranoia persist today and moderately worse than yesterday.  We will discuss treatment with a mood stabilizer in more depth tomorrow.  His anger regarding the petition process limited the interview today.    Hepatitis C: New diagnosis.  Awaiting confirmatory results.  Plan for outpatient follow-up.    Psychosocial treatments to be addressed with social work consult and groups.    Psychology consultation appreciated and results were reviewed today.  I will plan to review these results with the patient tomorrow.  Testing raises suspicion for the presence of an underlying mood disorder such as bipolar disorder or schizoaffective disorder.    Legal status: Now on a court hold for treatment of mental illness as we await court examination.    Disposition: To be determined.  The patient is refusing residential treatment options.  His father tells me that he is not welcome back home unless he completes treatment first to ensure sobriety.

## 2019-04-11 NOTE — PROGRESS NOTES
"Tani was initially very angry and upset at the beginning of the shift as he found out he wouldn't be discharged today. Iis very upset that \"My Mom is giving you information and I didn't fill out an MAHI for her.\"  MAHI is filled out for Father. Tani was able to calm himself down and was much better mood wise by the end of the shift.  "

## 2019-04-11 NOTE — PLAN OF CARE
Pt in room most of shift. Pt frustrated, angry about petition process and being forced to stay in the hospital. Pt available to processing frustration, discussing court process. Pt voices in agreement that he does want treatment for bipolar disorder. Is in agreement to start medication.  Again voices that he does not want to be forced to go to CD tx. Pt states he is upset that being in hospital may cost him a potential job, girlfriend.

## 2019-04-11 NOTE — PROGRESS NOTES
"[facility], [place]   Psychiatric Progress Note        Interim History:   The patient's care was discussed with the treatment team during the daily team meeting and/or staff's chart notes were reviewed.  Staff report: No acute issues.  Slept well.  Eating meals.  Taking medications. No aggression or hostility.    Seemed a bit frustrated after he received the court documentation today.  Shortly afterwards, he approached for a meeting hoping to have questions answered.  He seemed to respond well to our meeting    We reviewed the results of the neuropsychological testing.  We discussed his diagnosis and treatment plan.  He seemed agreeable for these interventions and seemed accepting of the diagnosis.    Patient denied auditory and visual hallucinations.  He slept well last night and did not report vegetative symptoms.  He denied feeling helpless or hopeless. Denied anhedonia.    No side effects reported to gabapentin.           Medications:       ARIPiprazole  5 mg Oral Daily     gabapentin  100 mg Oral TID          Allergies:   No Known Allergies       Labs:   No results found for this or any previous visit (from the past 24 hour(s)).       Psychiatric Examination:     /87   Pulse 80   Temp 97.4  F (36.3  C) (Tympanic)   Resp 16   Ht 1.803 m (5' 11\")   Wt 75.3 kg (166 lb)   SpO2 97%   BMI 23.15 kg/m    Weight is 166 lbs 0 oz  Body mass index is 23.15 kg/m .  Orthostatic Vitals       Most Recent      Sitting Orthostatic /81 04/07 1616    Sitting Orthostatic Pulse (bpm) 83 04/07 1616            Appearance: awake, alert  Attitude:  cooperative  Eye Contact:  better  Mood:  angry and anxious  Affect:  labile and guarded  Speech:  pressured speech  Psychomotor Behavior:  no evidence of tardive dyskinesia, dystonia, or tics  Throught Process:  tangental  Associations:  no loose associations  Thought Content:  no evidence of suicidal ideation or homicidal ideation and There " seemed to be a prominent theme of paranoid concerns.  Insight:  limited  Judgement:  limited  Oriented to:  time, person, and place  Attention Span and Concentration:  intact  Recent and Remote Memory:  fair    Clinical Global Impressions  First:  Considering your total clinical experience with this particular patient population, how severe are the patient's symptoms at this time?: 7 (04/08/19 1253)  Compared to the patient's condition at the START of treatment, this patient's condition is:: 3 (04/08/19 1253)  Most recent:  Considering your total clinical experience with this particular patient population, how severe are the patient's symptoms at this time?: 7 (04/08/19 1253)  Compared to the patient's condition at the START of treatment, this patient's condition is:: 3 (04/08/19 1253)         Precautions:     Behavioral Orders   Procedures     Code 1 - Restrict to Unit     [place]     MMPI 2     Routine Programming     As clinically indicated     Status 15     Every 15 minutes.     Suicide precautions     Patients on Suicide Precautions should have a Combination Diet ordered that includes a Diet selection(s) AND a Behavioral Tray selection for Safe Tray - with utensils, or Safe Tray - NO utensils            DIagnoses:      Bipolar disorder type I-recent episode mixed (provisional)  Stimulant use disorder, amphetamine type substance, severe  Cannabis use disorder, moderate         Plan:     The patient was agreeable to start Abilify for mood stabilization and reduction of paranoia.  5 mg daily will be initiated today as we monitor response and tolerability.  Risks and benefits were reviewed.    Hepatitis C: New diagnosis.  Awaiting confirmatory results.  Plan for outpatient follow-up.    Psychosocial treatments to be addressed with social work consult and groups.    Psychology consultation appreciated and results were reviewed today.  Results were reviewed with the patient.  Education was offered regarding his  diagnosis.    Legal status: On a court hold for treatment of mental illness as we await court examination.    Disposition: To be determined.  The patient is refusing residential treatment options.  His father tells me that he is not welcome back home unless he completes treatment first to ensure sobriety.

## 2019-04-12 PROCEDURE — 25000132 ZZH RX MED GY IP 250 OP 250 PS 637: Performed by: PSYCHIATRY & NEUROLOGY

## 2019-04-12 PROCEDURE — 12400001 ZZH R&B MH UMMC

## 2019-04-12 RX ADMIN — GABAPENTIN 100 MG: 100 CAPSULE ORAL at 21:48

## 2019-04-12 RX ADMIN — NICOTINE POLACRILEX 4 MG: 2 LOZENGE ORAL at 14:39

## 2019-04-12 RX ADMIN — GABAPENTIN 100 MG: 100 CAPSULE ORAL at 14:37

## 2019-04-12 RX ADMIN — ARIPIPRAZOLE 5 MG: 5 TABLET ORAL at 08:46

## 2019-04-12 RX ADMIN — GABAPENTIN 100 MG: 100 CAPSULE ORAL at 08:46

## 2019-04-12 ASSESSMENT — ACTIVITIES OF DAILY LIVING (ADL)
ORAL_HYGIENE: INDEPENDENT
LAUNDRY: WITH SUPERVISION
DRESS: INDEPENDENT
DRESS: STREET CLOTHES;INDEPENDENT
HYGIENE/GROOMING: INDEPENDENT
HYGIENE/GROOMING: HANDWASHING;SHOWER;INDEPENDENT
ORAL_HYGIENE: INDEPENDENT

## 2019-04-12 NOTE — PROGRESS NOTES
Pt received a phone call from his mother and did have a reasonable conversation with her.    Pt was visited on the unit by  today.

## 2019-04-12 NOTE — PROGRESS NOTES
04/11/19 2227   Behavioral Health   Hallucinations denies / not responding to hallucinations   Thinking distractable   Orientation person: oriented   Memory baseline memory   Insight denial of illness   Affect blunted, flat;irritable   Suicidality other (see comments)  (pt denies)   Self Injury other (see comment)  (pt denies)   Activity   (isolative)   Activities of Daily Living   Hygiene/Grooming independent   Oral Hygiene independent   Dress independent   Room Organization independent

## 2019-04-12 NOTE — PLAN OF CARE
Pt resting in room most of shift.  Pt out for meals and to talk with staff and meet with . Pt reports feeling very frustrated and concerned about court process. Pt expressed frustration with medications as reporting that he feels exactly the same on the medication as before the medication and wonders if it doing anything for him patient agreed to meet with writer to discuss medications in our 1:1,  however when met pt focused on court process. Writer reviewed court process steps with him. We were able to identify naomy of agreement and review some pt goals.  Pt admits he had period of psychosis, and agrees he may have mood disorder.  Pt agrees he wants treatment for them. Pt states he wants stability to be successful in school and to maintain employment.  Pt agree that he needs to stay away from methamphetamines to maintain good mental health.

## 2019-04-13 PROCEDURE — 12400001 ZZH R&B MH UMMC

## 2019-04-13 PROCEDURE — 25000132 ZZH RX MED GY IP 250 OP 250 PS 637: Performed by: PSYCHIATRY & NEUROLOGY

## 2019-04-13 RX ADMIN — GABAPENTIN 100 MG: 100 CAPSULE ORAL at 08:45

## 2019-04-13 RX ADMIN — NICOTINE POLACRILEX 4 MG: 2 LOZENGE ORAL at 11:18

## 2019-04-13 RX ADMIN — TRAZODONE HYDROCHLORIDE 50 MG: 50 TABLET ORAL at 21:56

## 2019-04-13 RX ADMIN — ARIPIPRAZOLE 5 MG: 5 TABLET ORAL at 08:45

## 2019-04-13 RX ADMIN — GABAPENTIN 100 MG: 100 CAPSULE ORAL at 13:58

## 2019-04-13 RX ADMIN — NICOTINE POLACRILEX 4 MG: 2 LOZENGE ORAL at 19:50

## 2019-04-13 RX ADMIN — TRAZODONE HYDROCHLORIDE 50 MG: 50 TABLET ORAL at 21:26

## 2019-04-13 RX ADMIN — NICOTINE POLACRILEX 4 MG: 2 LOZENGE ORAL at 16:40

## 2019-04-13 RX ADMIN — GABAPENTIN 100 MG: 100 CAPSULE ORAL at 19:50

## 2019-04-13 NOTE — PROGRESS NOTES
Pt was less irritable this evening. Pt watched parts of movie. Pt denies SI/SIB.      04/12/19 0654   Behavioral Health   Hallucinations denies / not responding to hallucinations   Thinking distractable   Orientation person: oriented;place: oriented   Memory baseline memory   Insight poor   Judgement impaired   Eye Contact at examiner   Affect blunted, flat   Mood mood is calm   Suicidality other (see comments)  (none reported or observed)   Self Injury other (see comment)  (none observed or reported)   Activity other (see comment)  (pt watched a little tv)   Activities of Daily Living   Hygiene/Grooming independent   Oral Hygiene independent   Dress independent   Room Organization independent

## 2019-04-13 NOTE — PLAN OF CARE
Patient states that he feels that his medications are working well for him. States that he feels that his thinking has improved as he is able to function without questioning himself. Rated his anxiety a 0 today. Patient related that he had a stressful phone call with his mother as she is pressing him to go to rehab. Patient says that he felt he did well with his mother as he usually gets more upset then he did. Feels stressed about court on Monday. Arguing with parent on phone at the end of the shift.

## 2019-04-14 PROCEDURE — 25000132 ZZH RX MED GY IP 250 OP 250 PS 637: Performed by: PSYCHIATRY & NEUROLOGY

## 2019-04-14 PROCEDURE — 12400001 ZZH R&B MH UMMC

## 2019-04-14 RX ADMIN — GABAPENTIN 100 MG: 100 CAPSULE ORAL at 21:00

## 2019-04-14 RX ADMIN — NICOTINE POLACRILEX 4 MG: 2 LOZENGE ORAL at 21:00

## 2019-04-14 RX ADMIN — ARIPIPRAZOLE 5 MG: 5 TABLET ORAL at 08:35

## 2019-04-14 RX ADMIN — HYDROXYZINE HYDROCHLORIDE 25 MG: 25 TABLET ORAL at 18:53

## 2019-04-14 RX ADMIN — TRAZODONE HYDROCHLORIDE 50 MG: 50 TABLET ORAL at 22:50

## 2019-04-14 RX ADMIN — GABAPENTIN 100 MG: 100 CAPSULE ORAL at 08:34

## 2019-04-14 RX ADMIN — NICOTINE POLACRILEX 4 MG: 2 LOZENGE ORAL at 16:34

## 2019-04-14 RX ADMIN — GABAPENTIN 100 MG: 100 CAPSULE ORAL at 13:46

## 2019-04-14 ASSESSMENT — ACTIVITIES OF DAILY LIVING (ADL)
DRESS: INDEPENDENT
HYGIENE/GROOMING: INDEPENDENT
ORAL_HYGIENE: INDEPENDENT
LAUNDRY: WITH SUPERVISION

## 2019-04-14 ASSESSMENT — MIFFLIN-ST. JEOR: SCORE: 1762.32

## 2019-04-14 NOTE — PROGRESS NOTES
Pt was visible and appropriate in the milieu, engaged with peers and staff, and ate meals and snacks. Pt was polite and well groomed. Pt appears to be in denial that he has any mental health and addiction issues. Pt denies SI SIB.      04/13/19 2300   Behavioral Health   Hallucinations denies / not responding to hallucinations   Thinking intact   Orientation person: oriented;place: oriented;date: oriented;time: oriented   Memory baseline memory   Insight denial of illness;poor   Judgement intact   Eye Contact at examiner   Mood mood is calm   Physical Appearance/Attire attire appropriate to age and situation   Hygiene well groomed   1. Wish to be Dead No   2. Non-Specific Active Suicidal Thoughts  No

## 2019-04-14 NOTE — PROGRESS NOTES
Brief Medicine Note, 4/14/19:     Following up on HCV RNA Quant - detectable at 33,953.  Will need follow up with GI as outpatient per previous discussion with pt and Govind Augustin PA-C on 4/8.  Referral placed for GI/Hepatology.      Medicine will sign off, no further recommendations at this time.  Please feel free to reconsult if patient's symptoms worsen or if new problems arise.  Thank you for the opportunity to care for this patient.     Sussy Ocampo, CNP, APRN  Internal Medicine [facility] Hospitalist  [facility]  Pager [phone]

## 2019-04-15 PROCEDURE — 99231 SBSQ HOSP IP/OBS SF/LOW 25: CPT | Performed by: PSYCHIATRY & NEUROLOGY

## 2019-04-15 PROCEDURE — 12400001 ZZH R&B MH UMMC

## 2019-04-15 PROCEDURE — 99207 ZZC CDG-MDM COMPONENT: MEETS LOW - DOWN CODED: CPT | Performed by: PSYCHIATRY & NEUROLOGY

## 2019-04-15 PROCEDURE — 25000132 ZZH RX MED GY IP 250 OP 250 PS 637: Performed by: PSYCHIATRY & NEUROLOGY

## 2019-04-15 RX ADMIN — ARIPIPRAZOLE 5 MG: 5 TABLET ORAL at 08:24

## 2019-04-15 RX ADMIN — GABAPENTIN 100 MG: 100 CAPSULE ORAL at 08:24

## 2019-04-15 RX ADMIN — TRAZODONE HYDROCHLORIDE 50 MG: 50 TABLET ORAL at 22:52

## 2019-04-15 RX ADMIN — GABAPENTIN 100 MG: 100 CAPSULE ORAL at 13:10

## 2019-04-15 RX ADMIN — NICOTINE POLACRILEX 4 MG: 2 LOZENGE ORAL at 17:45

## 2019-04-15 RX ADMIN — NICOTINE POLACRILEX 4 MG: 2 LOZENGE ORAL at 21:20

## 2019-04-15 RX ADMIN — GABAPENTIN 100 MG: 100 CAPSULE ORAL at 21:20

## 2019-04-15 ASSESSMENT — ACTIVITIES OF DAILY LIVING (ADL)
HYGIENE/GROOMING: INDEPENDENT;HANDWASHING
DRESS: SCRUBS (BEHAVIORAL HEALTH);INDEPENDENT
ORAL_HYGIENE: INDEPENDENT
LAUNDRY: WITH SUPERVISION

## 2019-04-15 NOTE — PROGRESS NOTES
CTC took call from Tmo, patient's dad  Writer spoke with Tom who had questions about the court hearing and process. Most of his questions were answered and he was given the [facility] court number to reach out and get answers for the questions that writer could not answer.

## 2019-04-15 NOTE — PLAN OF CARE
Patient focused on court that he has this afternoon. Asking questions on the process of court. Anxious. Attending groups. Denies any suicidal or self harm thoughts.

## 2019-04-15 NOTE — PLAN OF CARE
"Pt anxious and tense regarding court hearing tomorrow. He states he is having difficulty understanding the process. Spent time with him reviewing legal status of commitment and what it could mean. Able to understand some of what was explained, but focuses on fear of \"being locked up in some hospital for 6 months\" despite reassurance.   Patient continues to express fear for his family and states someone without his high level of computer skill and understanding wouldn't understand the threat.   Patient also shows no insight into role MI and meth use play in symptoms and patients admitted difficulty with telling dreams from reality on the night of his admission. He denies having a drug problem and thinks meth is not very addictive. \"I can drop it anytime I want to. No one gets addicted. It's like 13% get addicted.\" Then discusses his various friends who use meth (either smoking or IV use) and states snorting it as he does lessens the chance of addiction because there is not an associated ritual with the use.   Patient cooperative with interaction. He attended the AA meeting and stated he could relate to some of their stories.   Patient given PRN hydroxyzine with minimal relief.   Vital signs:  Temp: 98.1  F (36.7  C) Temp src: Oral BP: 129/77 Pulse: 80             Length of stay: 9     Assessment of mood, thought process, response to medications and potential side effects continues.     Patient encouraged to participate in therapeutic groups and develop self-care skills.     Appropriate precautions maintained.       "

## 2019-04-16 ENCOUNTER — TELEPHONE (OUTPATIENT)
Facility: CLINIC | Age: 29
End: 2019-04-16

## 2019-04-16 PROCEDURE — 99207 ZZC CDG-MDM COMPONENT: MEETS LOW - DOWN CODED: CPT | Performed by: PSYCHIATRY & NEUROLOGY

## 2019-04-16 PROCEDURE — 25000132 ZZH RX MED GY IP 250 OP 250 PS 637: Performed by: PSYCHIATRY & NEUROLOGY

## 2019-04-16 PROCEDURE — 99231 SBSQ HOSP IP/OBS SF/LOW 25: CPT | Performed by: PSYCHIATRY & NEUROLOGY

## 2019-04-16 PROCEDURE — 12400001 ZZH R&B MH UMMC

## 2019-04-16 RX ORDER — GABAPENTIN 100 MG/1
100 CAPSULE ORAL 3 TIMES DAILY
Qty: 90 CAPSULE | Refills: 0 | Status: SHIPPED | OUTPATIENT
Start: 2019-04-16 | End: 2019-04-22

## 2019-04-16 RX ORDER — ARIPIPRAZOLE 5 MG/1
5 TABLET ORAL DAILY
Qty: 30 TABLET | Refills: 0 | Status: SHIPPED | OUTPATIENT
Start: 2019-04-17 | End: 2022-04-13

## 2019-04-16 RX ADMIN — NICOTINE POLACRILEX 2 MG: 2 LOZENGE ORAL at 08:51

## 2019-04-16 RX ADMIN — GABAPENTIN 100 MG: 100 CAPSULE ORAL at 14:03

## 2019-04-16 RX ADMIN — ARIPIPRAZOLE 5 MG: 5 TABLET ORAL at 08:50

## 2019-04-16 RX ADMIN — GABAPENTIN 100 MG: 100 CAPSULE ORAL at 08:50

## 2019-04-16 RX ADMIN — NICOTINE POLACRILEX 4 MG: 2 GUM, CHEWING BUCCAL at 18:26

## 2019-04-16 RX ADMIN — HYDROXYZINE HYDROCHLORIDE 25 MG: 25 TABLET ORAL at 17:37

## 2019-04-16 RX ADMIN — NICOTINE POLACRILEX 4 MG: 2 LOZENGE ORAL at 16:27

## 2019-04-16 RX ADMIN — TRAZODONE HYDROCHLORIDE 50 MG: 50 TABLET ORAL at 22:41

## 2019-04-16 RX ADMIN — GABAPENTIN 100 MG: 100 CAPSULE ORAL at 19:47

## 2019-04-16 RX ADMIN — NICOTINE POLACRILEX 4 MG: 2 LOZENGE ORAL at 14:03

## 2019-04-16 ASSESSMENT — ACTIVITIES OF DAILY LIVING (ADL)
ORAL_HYGIENE: INDEPENDENT
DRESS: INDEPENDENT
DRESS: STREET CLOTHES;INDEPENDENT;SCRUBS (BEHAVIORAL HEALTH)
LAUNDRY: WITH SUPERVISION
HYGIENE/GROOMING: INDEPENDENT;SHOWER;HANDWASHING
LAUNDRY: WITH SUPERVISION
HYGIENE/GROOMING: INDEPENDENT
ORAL_HYGIENE: INDEPENDENT

## 2019-04-16 ASSESSMENT — MIFFLIN-ST. JEOR: SCORE: 1757.78

## 2019-04-16 NOTE — PROGRESS NOTES
"Nutrition Services Progress Note    Received pt/family request consult:  \"Pt would like options for healthier snacks.\"    Per pt he has been eating well at meals since here and more than usual at home.  He states usually grazes throughout the day and eats only 1 meal.  No known food allergies.  Per chart review pt reported gain of almost 20 lbs since in the hospital. However admission weight (4/5)  161 lbs and current weight 171 lbs which reflects a 10 lb weight gain.  Pt reports UBW varies from 160-170 lbs.  This is within normal BMI range.    Interventions:  Ordered snacks bid between meals at 2 pm and HS per pt request.      Follow Up/Monitoring:  No further follow up planned at this time.  Please consult if further needs arise.    Shira Ang RD, LD    " [facility]        Subjective     Doing well, passing flatus, tolerating clears well, no N/V, no pain    Objective     Patient Vitals for the past 24 hrs:   Temp Pulse Resp BP SpO2   05/08/18 0850 - - - - 100 %   05/08/18 0838 99.1 °F (37.3 °C) 85 16 124/66 100 %   05/08/18 0324 97.9 °F (36.6 °C) 83 16 108/68 97 %   05/07/18 2139 - - - - 95 %   05/07/18 2057 98.4 °F (36.9 °C) 81 16 102/52 97 %   05/07/18 1730 - 82 - 107/64 -   05/07/18 1413 98.5 °F (36.9 °C) 79 16 125/61 94 %         Date 05/07/18 0700 - 05/08/18 0659 05/08/18 0700 - 05/09/18 0659   Shift 2385-3338 8107-9268 24 Hour Total 5460-9822 9573-3592 24 Hour Total   I  N  T  A  K  E   P.O. 240  240 2400  2400      P. O. 240  240 2400  2400    Shift Total  (mL/kg) 240  (3.8)  240  (3.8) 2400  (38.1)  2400  (38.1)   O  U  T  P  U  T   Urine  (mL/kg/hr) 450  (0.6)  450  (0.3) 800  800      Urine Voided    800  800      Urine Occurrence(s) 1 x 2 x 3 x         Urine Output (mL) (External Female Catheter 04/30/18) 450  450       Shift Total  (mL/kg) 450  (7.2)  450  (7.1) 800  (12.7)  800  (12.7)   NET -210  -210 1600  1600   Weight (kg) 62.8 63 63 63 63 63       PE  Pulm - CTAB  CV - RRR  Abd - soft, ND, BS Present, NTTP    Labs  Recent Results (from the past 12 hour(s))   METABOLIC PANEL, COMPREHENSIVE    Collection Time: 05/08/18  3:16 AM   Result Value Ref Range    Sodium 135 (L) 136 - 145 mmol/L    Potassium 3.6 3.5 - 5.1 mmol/L    Chloride 98 97 - 108 mmol/L    CO2 29 21 - 32 mmol/L    Anion gap 8 5 - 15 mmol/L    Glucose 89 65 - 100 mg/dL    BUN 20 6 - 20 MG/DL    Creatinine 0.79 0.55 - 1.02 MG/DL    BUN/Creatinine ratio 25 (H) 12 - 20      GFR est AA >60 >60 ml/min/1.73m2    GFR est non-AA >60 >60 ml/min/1.73m2    Calcium 7.8 (L) 8.5 - 10.1 MG/DL    Bilirubin, total 0.5 0.2 - 1.0 MG/DL    ALT (SGPT) 18 12 - 78 U/L    AST (SGOT) 29 15 - 37 U/L    Alk.  phosphatase 430 (H) 45 - 117 U/L Protein, total 6.1 (L) 6.4 - 8.2 g/dL    Albumin 1.7 (L) 3.5 - 5.0 g/dL    Globulin 4.4 (H) 2.0 - 4.0 g/dL    A-G Ratio 0.4 (L) 1.1 - 2.2     CBC WITH AUTOMATED DIFF    Collection Time: 05/08/18  3:16 AM   Result Value Ref Range    WBC 8.6 3.6 - 11.0 K/uL    RBC 3.06 (L) 3.80 - 5.20 M/uL    HGB 8.7 (L) 11.5 - 16.0 g/dL    HCT 27.5 (L) 35.0 - 47.0 %    MCV 89.9 80.0 - 99.0 FL    MCH 28.4 26.0 - 34.0 PG    MCHC 31.6 30.0 - 36.5 g/dL    RDW 21.2 (H) 11.5 - 14.5 %    PLATELET 691 518 - 811 K/uL    MPV 10.3 8.9 - 12.9 FL    NRBC 0.0 0  WBC    ABSOLUTE NRBC 0.00 0.00 - 0.01 K/uL    NEUTROPHILS 70 32 - 75 %    LYMPHOCYTES 15 12 - 49 %    MONOCYTES 11 5 - 13 %    EOSINOPHILS 2 0 - 7 %    BASOPHILS 1 0 - 1 %    IMMATURE GRANULOCYTES 1 (H) 0.0 - 0.5 %    ABS. NEUTROPHILS 6.0 1.8 - 8.0 K/UL    ABS. LYMPHOCYTES 1.3 0.8 - 3.5 K/UL    ABS. MONOCYTES 0.9 0.0 - 1.0 K/UL    ABS. EOSINOPHILS 0.2 0.0 - 0.4 K/UL    ABS. BASOPHILS 0.1 0.0 - 0.1 K/UL    ABS. IMM. GRANS. 0.1 (H) 0.00 - 0.04 K/UL    DF SMEAR SCANNED      PLATELET COMMENTS Large Platelets      RBC COMMENTS ANISOCYTOSIS  2+        RBC COMMENTS POLYCHROMASIA  1+        RBC COMMENTS OVALOCYTES  PRESENT        RBC COMMENTS TARGET CELLS  PRESENT        WBC COMMENTS HYPERSEGMENTED POLYS     MAGNESIUM    Collection Time: 05/08/18  3:16 AM   Result Value Ref Range    Magnesium 1.7 1.6 - 2.4 mg/dL   GLUCOSE, POC    Collection Time: 05/08/18  6:35 AM   Result Value Ref Range    Glucose (POC) 76 65 - 100 mg/dL    Performed by Windy Darby 80, POC    Collection Time: 05/08/18  6:51 AM   Result Value Ref Range    Glucose (POC) 87 65 - 100 mg/dL    Performed by Carnell Angelucci is a 78 y. o.yr old female with resolved pSBO    Plan     GI lite diet today  SBO seems to be resolved  Continue current care  Following    Mulugeta Hernandez MD

## 2019-04-16 NOTE — PLAN OF CARE
BEHAVIORAL TEAM DISCUSSION    Participants:   Clau Franklin, Yael Pro RN, Caity Paredes MSOTR\L      Progress:   This is day 11 of this admission.  Pt came in with psychoses and drug use.  A petition for committment was filed (MI-CD) and only the MI portion was supported.  Pt completed the MMPI and the results were invalid.  Pt's symptoms are improved.There is a small amount of paranoia evident.  Pt is not interested in treatment for substance use.  Pt is cooperative rather than swearing and agitated last week. Affect is bright.  Pt is accepting of his new diagnosis of Bipolar DO.      Continued Stay Criteria/Rationale:   The pt will be discharged when he is no longer a danger to himself     Medical/Physical:   STD's negative.  New diagnosis of Hepatitis C. Saw Internal Medicine on 4/7/19.        Precautions:   Behavioral Orders   Procedures     Code 1 - Restrict to Unit     [place]     MMPI 2     Routine Programming     As clinically indicated     Status 15     Every 15 minutes.     Suicide precautions     Patients on Suicide Precautions should have a Combination Diet ordered that includes a Diet selection(s) AND a Behavioral Tray selection for Safe Tray - with utensils, or Safe Tray - NO utensils       Plan:   Team is agreeing to stay of commitment.  Pt to live with parents or girlfriend.  Pt to go to IOP treatment at [facility].      Rationale for change in precautions or plan:  No changes

## 2019-04-16 NOTE — TELEPHONE ENCOUNTER
PA Initiation    Medication: Aripiprazole 5mg tablets  Insurance Company: [employer] - Phone [phone] Fax [phone]  Pharmacy Filling the Rx: [facility] - [place], [place] - [address]  Filling Pharmacy Phone: [phone]  Filling Pharmacy Fax: [phone]  Start Date: 4/16/2019  CM Key: [id number] - PA Case ID: [id number]    [facility]  [address]  [address], [place] [address]   [email or web address]  [email or web address]   Phone: [phone]  Pager: [phone]  Fax: [phone]

## 2019-04-16 NOTE — PROGRESS NOTES
"  Pt went to court yesterday. Probable cause was found. The court hold continues. The pt will return to court on April 18 at 10:30AM.    Divya Madrigal called from the court @10:16 @ [phone]. She asked if we would agree to a stay of commitment and not go back into court. I checked with MD and we are in agreement if OP plans fall into place. I let Divya know I would call her back in a couple of hours.    I met with pt. We called his father to confirm that the pt can return home and could not reach him. He does not have a voice mail box set up.  I explained [employer] ride services.  We called [facility] and set up day treatment and medication management services.  Pt wanted meds to go to [facility] but MD prefers meds to be ordered here.  Pt asked about cash assistance and social sercurity and I showed him these resources.  I set GI appt.      We finally reached the father and had a phone conference. Father was absolutely opposed to the pt coming home and said \"this is out of the blue.\"  Father was quite  upset by this proposal. He said that the pt needs to go to inpatient treatment. This upset pt and he pushed back and was verbally hostile to the father. Pt told father he was the only thing holding him in here. Pt said \"let me come home for a day to get my things and I will go to my girlfriends.\"  I asked if we should call his girlfriend to see if he can stay there and he shut this conversation down.  Pt left the room. Father wanted me to talk the mother and he put mother on.  They says that they are afraid of the pt and that father works at night and mother is home alone.  They said that they have tried this before with him doing outpatient work. They said that he went to [facility] under a court order for a DUI and he lied to the counselor's. They say that the pt does not think that he has a problem. Parents say he can come home after her goes to rehab.    Pt went to the phone later and spoke loudly and sharply " to his father about not letting him come home.    I called Divya and relayed this sequence of events. She said that this was not the deal. She said the pt needs a Rule 25 and I said I understand that he has no intention of sobriety.  I consulted with MD who reminds me that the court did not uphold the CD portion and that the pt plans to stop meth but not marijuana.  I will sort this out with pt and Divya.    I cancelled this GI appointment for Thursday.   Attend your new patient GI appointment for your new diagnosis of Hepatitis C on Thursday, April 18, 2019 at 10:15 for check in..  Apr 18, 2019 10:30 AM CDT  (Arrive by 10:15 AM)  New General Liver with Thomas Michael Leventhal, MD  [facility] ([facility]) [address] [place] [address]  [address]  [facility] [id number] [phone]

## 2019-04-16 NOTE — PROGRESS NOTES
"[facility], [place]   Psychiatric Progress Note        Interim History:   The patient's care was discussed with the treatment team during the daily team meeting and/or staff's chart notes were reviewed.  Staff report: No acute issues.  Slept well.  Eating meals.  Taking medications. No aggression or hostility.    He reports gaining benefit from Abilify.  Mood is improving.  Less lability reported.  Less irritability reported.  Tolerating the medication without side effects.    Patient denied auditory and visual hallucinations.  He slept well last night and did not report vegetative symptoms.  He denied feeling helpless or hopeless. Denied anhedonia.    His preference is to pursue an intensive outpatient program, recalling good response to [facility] and [facility] day program in the past.  He plans to contact his girlfriend to see if he can stay with her otherwise, he will talk to his parents about staying with them.  He prefers not to pursue residential treatment and finds the idea of living with strangers quite uncomfortable.  He is motivated to maintain sobriety and denies any cravings or intent to use.           Medications:       ARIPiprazole  5 mg Oral Daily     gabapentin  100 mg Oral TID          Allergies:   No Known Allergies       Labs:   No results found for this or any previous visit (from the past 24 hour(s)).       Psychiatric Examination:     /77 (BP Location: Right arm)   Pulse 80   Temp 96.8  F (36  C)   Resp 16   Ht 1.803 m (5' 11\")   Wt 77.6 kg (171 lb)   SpO2 99%   BMI 23.85 kg/m    Weight is 171 lbs 0 oz  Body mass index is 23.85 kg/m .  Orthostatic Vitals       Most Recent      Sitting Orthostatic /81 04/07 1616    Sitting Orthostatic Pulse (bpm) 83 04/07 1616            Appearance: awake, alert  Attitude:  cooperative  Eye Contact:  better  Mood:  better  Affect:  appropriate and in normal range  Speech:  pressured speech  Psychomotor Behavior:  no " evidence of tardive dyskinesia, dystonia, or tics  Throught Process:  tangental, mild  Associations:  no loose associations  Thought Content:  no evidence of suicidal ideation or homicidal ideation and There seemed to be a prominent theme of paranoid concerns.,  Mild  Insight:  partial  Judgement:  limited  Oriented to:  time, person, and place  Attention Span and Concentration:  intact  Recent and Remote Memory:  fair    Clinical Global Impressions  First:  Considering your total clinical experience with this particular patient population, how severe are the patient's symptoms at this time?: 7 (04/08/19 1253)  Compared to the patient's condition at the START of treatment, this patient's condition is:: 3 (04/08/19 1253)  Most recent:  Considering your total clinical experience with this particular patient population, how severe are the patient's symptoms at this time?: 7 (04/08/19 1253)  Compared to the patient's condition at the START of treatment, this patient's condition is:: 3 (04/08/19 1253)         Precautions:     Behavioral Orders   Procedures     Code 1 - Restrict to Unit     [facility]     MMPI 2     Routine Programming     As clinically indicated     Status 15     Every 15 minutes.     Suicide precautions     Patients on Suicide Precautions should have a Combination Diet ordered that includes a Diet selection(s) AND a Behavioral Tray selection for Safe Tray - with utensils, or Safe Tray - NO utensils            DIagnoses:      Bipolar disorder type I-recent episode mixed (provisional)  Stimulant use disorder, amphetamine type substance, severe  Cannabis use disorder, moderate         Plan:     Continue Abilify for mood stabilization.Tolerating the medication without side effects.    Hepatitis C: New diagnosis.  Awaiting confirmatory results.  Plan for outpatient follow-up.    Psychosocial treatments to be addressed with social work consult and groups.    Psychology consultation and neuropsychological testing  complete and results have been reviewed with the patient.    Legal status: On a court hold for treatment of mental illness as we await court examination.    Disposition: To be determined.  The patient is refusing residential treatment options.  His father tells me that he is not welcome back home unless he completes treatment first to ensure sobriety.

## 2019-04-16 NOTE — PROGRESS NOTES
04/16/19 1447   Behavioral Health   Thoughts/Cognition (WDL) WDL   Hallucinations denies / not responding to hallucinations   Thinking poor concentration   Orientation person: oriented;place: oriented;date: oriented;time: oriented   Memory baseline memory   Insight poor   Judgement impaired   Eye Contact at examiner   Affect/Mood (WDL) WDL   Affect full range affect   Mood mood is calm   ADL Assessment (WDL) WDL   Physical Appearance/Attire attire appropriate to age and situation   Hygiene well groomed   Suicidality (WDL) WDL   Suicidality other (see comments)  (Patient denies)   1. Wish to be Dead No   2. Non-Specific Active Suicidal Thoughts  No   Activities of Daily Living   Hygiene/Grooming independent   Oral Hygiene independent   Dress independent   Laundry with supervision   Room Organization independent       Patient was visible and active out in the milieu for majority of the shift. Patient was social with staff and other patients while out in the milieu. Patient reports that his doctor gave him the okay to go home today if he was able to find a place to go. Patient and his CTC had a conference call  With the patient's parents and they were not willing to have him come home. Patient was on the phone with his parents and patient was yelling at his dad because they would not have him home. Patient denies SI/SIB. Patient has no concerns at this time.

## 2019-04-16 NOTE — TELEPHONE ENCOUNTER
Prior Authorization Approval    Authorization Effective Date: 3/17/2019  Authorization Expiration Date: 4/15/2020  Medication: Aripiprazole 5mg tablets  Approved Dose/Quantity: 1 tablet daily  Reference #: CMM Key: [id number] - [place] Case ID: [id number]   Insurance Company: [employer] - Phone [phone] Fax [phone]  Expected CoPay: $0.00     CoPay Card Available: No    Foundation Assistance Needed: n/a  Which Pharmacy is filling the prescription (Not needed for infusion/clinic administered): [facility] [place], [place] - [address] [place] [address]  Pharmacy Notified: Yes  Patient Notified: Yes    Sarah Esqueda  [place] [facility] Liaison     [facility]  [address], [place] [address]   [email or web address]   Phone: [phone]  Pager: [phone]  Fax: [phone]

## 2019-04-16 NOTE — PROGRESS NOTES
"Tani had a unremarkable shift. He arrived back from court and stated that things went \"okay\" and that he is looking forward to discharging sometime later this week (maybe Thursday or Friday). He reports that both his anxiety and depression have gotten better on his new medication. He does report however that the medication makes him excessively hungry and that he has gained almost 20lbs in the hospital. Tani denies SI/SIB and hallucinations. ADL's are independent, no nutrition concerns. No visitors this evening. Tani paced the hallway for some of the evening, threw a stress ball around in his room and socialized with his peers. He did not attend or participate in available programming.      04/15/19 2221   Behavioral Health   Hallucinations denies / not responding to hallucinations   Thinking poor concentration   Orientation date: oriented;place: oriented;person: oriented;time: oriented   Memory baseline memory   Insight poor   Judgement impaired   Eye Contact at examiner   Affect blunted, flat;other (see comments)  (Brightens upon interaction)   Mood anxious   Physical Appearance/Attire attire appropriate to age and situation;appears stated age;neat   Hygiene well groomed   Suicidality other (see comments)  (Patient denies)   1. Wish to be Dead No   2. Non-Specific Active Suicidal Thoughts  No   Self Injury other (see comment)  (Patient denies)   Elopement   (No indicators this shift)   Activity other (see comment)  (Visible in milieu, groups, social)   Speech coherent;clear   Medication Sensitivity no observed side effects;other (see comment)  (Pt states he is always hungry)   Psychomotor / Gait steady;balanced;paces   Psycho Education   Type of Intervention 1:1 intervention   Response participates, initiates socially appropriate   Hours 0.5   Treatment Detail Check-In   Activities of Daily Living   Hygiene/Grooming independent;handwashing   Oral Hygiene independent   Dress scrubs (behavioral " health);independent   Laundry with supervision   Room Organization independent

## 2019-04-17 PROCEDURE — 12400001 ZZH R&B MH UMMC

## 2019-04-17 PROCEDURE — 25000132 ZZH RX MED GY IP 250 OP 250 PS 637: Performed by: PSYCHIATRY & NEUROLOGY

## 2019-04-17 PROCEDURE — 99232 SBSQ HOSP IP/OBS MODERATE 35: CPT | Performed by: PSYCHIATRY & NEUROLOGY

## 2019-04-17 RX ADMIN — GABAPENTIN 100 MG: 100 CAPSULE ORAL at 14:56

## 2019-04-17 RX ADMIN — NICOTINE POLACRILEX 4 MG: 2 GUM, CHEWING BUCCAL at 20:16

## 2019-04-17 RX ADMIN — GABAPENTIN 100 MG: 100 CAPSULE ORAL at 08:36

## 2019-04-17 RX ADMIN — GABAPENTIN 100 MG: 100 CAPSULE ORAL at 20:16

## 2019-04-17 RX ADMIN — NICOTINE POLACRILEX 4 MG: 2 GUM, CHEWING BUCCAL at 21:49

## 2019-04-17 RX ADMIN — NICOTINE POLACRILEX 4 MG: 2 GUM, CHEWING BUCCAL at 18:33

## 2019-04-17 RX ADMIN — NICOTINE POLACRILEX 4 MG: 2 GUM, CHEWING BUCCAL at 17:20

## 2019-04-17 RX ADMIN — NICOTINE POLACRILEX 4 MG: 2 GUM, CHEWING BUCCAL at 09:17

## 2019-04-17 RX ADMIN — NICOTINE POLACRILEX 4 MG: 2 GUM, CHEWING BUCCAL at 14:55

## 2019-04-17 RX ADMIN — TRAZODONE HYDROCHLORIDE 50 MG: 50 TABLET ORAL at 23:02

## 2019-04-17 RX ADMIN — ARIPIPRAZOLE 5 MG: 5 TABLET ORAL at 08:36

## 2019-04-17 ASSESSMENT — ACTIVITIES OF DAILY LIVING (ADL)
DRESS: INDEPENDENT
ORAL_HYGIENE: INDEPENDENT
ORAL_HYGIENE: INDEPENDENT
LAUNDRY: WITH SUPERVISION
HYGIENE/GROOMING: INDEPENDENT
HYGIENE/GROOMING: INDEPENDENT
LAUNDRY: WITH SUPERVISION
DRESS: INDEPENDENT

## 2019-04-17 NOTE — PROGRESS NOTES
"[facility], [place]   Psychiatric Progress Note        Interim History:   The patient's care was discussed with the treatment team during the daily team meeting and/or staff's chart notes were reviewed.  Staff report: No acute issues.  Slept well.  Eating meals.  Taking medications. No aggression or hostility.    He explains that he is quite frustrated with his conversations with his father.  When they speak over the phone, he hears a clicking sound as if he is getting another call.  He wonders who his father is speaking to and why they are frequently calling when he is on the phone with his father.  He suspects that they are purposely not giving him his girlfriend's phone number.  He believes that his father may be influenced by his mother whom the patient has a more complicated relationship with.  He believes that his father would be willing to let him home if his mother was not so opposed to the idea.  He questions several times the possibility of an external influence prompting her decision.  He inquired as to how they know so much about his current situation.  He inquired if I have made contact with his mother recently.    He is not certain if the Abilify is helping anymore.  He wonders if the dose should be higher however hesitates to do so today.      Patient denied auditory and visual hallucinations.  He slept well last night and did not report vegetative symptoms.  He denied feeling helpless or hopeless. Denied anhedonia.           Medications:       ARIPiprazole  5 mg Oral Daily     gabapentin  100 mg Oral TID          Allergies:   No Known Allergies       Labs:   No results found for this or any previous visit (from the past 24 hour(s)).       Psychiatric Examination:     /74   Pulse 88   Temp 97.3  F (36.3  C) (Tympanic)   Resp 16   Ht 1.803 m (5' 11\")   Wt 77.6 kg (171 lb)   SpO2 98%   BMI 23.85 kg/m    Weight is 171 lbs 0 oz  Body mass index is 23.85 " kg/m .  Orthostatic Vitals       Most Recent      Sitting Orthostatic /81 04/07 1616    Sitting Orthostatic Pulse (bpm) 83 04/07 1616            Appearance: awake, alert  Attitude:  cooperative  Eye Contact:  better  Mood:  better  Affect:  appropriate and in normal range  Speech:  pressured speech  Psychomotor Behavior:  no evidence of tardive dyskinesia, dystonia, or tics  Throught Process:  tangental, mild  Associations:  no loose associations  Thought Content:  no evidence of suicidal ideation or homicidal ideation and There seemed to be a prominent theme of paranoid concerns.,  Mild  Insight:  partial  Judgement:  limited  Oriented to:  time, person, and place  Attention Span and Concentration:  intact  Recent and Remote Memory:  fair    Clinical Global Impressions  First:  Considering your total clinical experience with this particular patient population, how severe are the patient's symptoms at this time?: 7 (04/08/19 1253)  Compared to the patient's condition at the START of treatment, this patient's condition is:: 3 (04/08/19 1253)  Most recent:  Considering your total clinical experience with this particular patient population, how severe are the patient's symptoms at this time?: 7 (04/08/19 1253)  Compared to the patient's condition at the START of treatment, this patient's condition is:: 3 (04/08/19 1253)         Precautions:     Behavioral Orders   Procedures     Code 1 - Restrict to Unit     [facility]     MMPI 2     Routine Programming     As clinically indicated     Status 15     Every 15 minutes.     Suicide precautions     Patients on Suicide Precautions should have a Combination Diet ordered that includes a Diet selection(s) AND a Behavioral Tray selection for Safe Tray - with utensils, or Safe Tray - NO utensils            DIagnoses:      Bipolar disorder type I-recent episode mixed (provisional)  Stimulant use disorder, amphetamine type substance, severe  Cannabis use disorder, moderate          Plan:     Continue Abilify for mood stabilization.Tolerating the medication without side effects.  We discussed increasing the dose of Abilify soon.    Hepatitis C: New diagnosis.  Awaiting confirmatory results.  Plan for outpatient follow-up.    Psychosocial treatments to be addressed with social work consult and groups.    Psychology consultation and neuropsychological testing complete and results have been reviewed with the patient.  We will assist the patient in obtaining a chemical health assessment as we explore residential treatment options.    Legal status: Pursuing a stay of commitment.    Disposition: Exploring residential MI CD treatment facilities.

## 2019-04-17 NOTE — PROGRESS NOTES
04/17/19 1400   Behavioral Health   Hallucinations denies / not responding to hallucinations   Thinking intact   Orientation person: oriented;place: oriented;date: oriented;time: oriented   Memory baseline memory   Insight poor   Judgement impaired   Eye Contact at examiner   Affect full range affect   Mood mood is calm   Physical Appearance/Attire attire appropriate to age and situation   Hygiene well groomed   Suicidality other (see comments)  (Denies)   1. Wish to be Dead No   2. Non-Specific Active Suicidal Thoughts  No   Change in Protective Factors? No   Enviromental Risk Factors None   Self Injury other (see comment)  (Denies)   Activity other (see comment)  (Present in milieu)   Speech clear;coherent   Medication Sensitivity no stated side effects;no observed side effects   Psychomotor / Gait balanced;steady   Activities of Daily Living   Hygiene/Grooming independent   Oral Hygiene independent   Dress independent   Laundry with supervision   Room Organization independent     Pt. Was present in milieu and social with peers throughout the day. He denies any SISIB and is excited to enact the plans the staff has made for his treatment in the future.

## 2019-04-17 NOTE — PROGRESS NOTES
"[facility], [place]   Psychiatric Progress Note        Interim History:   The patient's care was discussed with the treatment team during the daily team meeting and/or staff's chart notes were reviewed.  Staff report: No acute issues.  Slept well.  Eating meals.  Taking medications. No aggression or hostility.    His mood remains overall improved.  No complaints regarding depressed mood or anxiety.  He continues to endorse improvements which she relates to taking Abilify.  Patient denied auditory and visual hallucinations.  He slept well last night and did not report vegetative symptoms.  He denied feeling helpless or hopeless. Denied anhedonia.    He is hoping to be discharged home today.  He will contact his father to inquire regarding returning home however he feels confident that if he agrees to engage in an outpatient program, his mother and father will allow him to return back home.           Medications:       ARIPiprazole  5 mg Oral Daily     gabapentin  100 mg Oral TID          Allergies:   No Known Allergies       Labs:   No results found for this or any previous visit (from the past 24 hour(s)).       Psychiatric Examination:     /74   Pulse 88   Temp 97.3  F (36.3  C) (Tympanic)   Resp 16   Ht 1.803 m (5' 11\")   Wt 77.6 kg (171 lb)   SpO2 98%   BMI 23.85 kg/m    Weight is 171 lbs 0 oz  Body mass index is 23.85 kg/m .  Orthostatic Vitals       Most Recent      Sitting Orthostatic /81 04/07 1616    Sitting Orthostatic Pulse (bpm) 83 04/07 1616            Appearance: awake, alert  Attitude:  cooperative  Eye Contact:  better  Mood:  better  Affect:  appropriate and in normal range  Speech:  pressured speech  Psychomotor Behavior:  no evidence of tardive dyskinesia, dystonia, or tics  Throught Process:  tangental, mild  Associations:  no loose associations  Thought Content:  no evidence of suicidal ideation or homicidal ideation and There seemed to be a " prominent theme of paranoid concerns.,  Mild  Insight:  partial  Judgement:  limited  Oriented to:  time, person, and place  Attention Span and Concentration:  intact  Recent and Remote Memory:  fair    Clinical Global Impressions  First:  Considering your total clinical experience with this particular patient population, how severe are the patient's symptoms at this time?: 7 (04/08/19 1253)  Compared to the patient's condition at the START of treatment, this patient's condition is:: 3 (04/08/19 1253)  Most recent:  Considering your total clinical experience with this particular patient population, how severe are the patient's symptoms at this time?: 7 (04/08/19 1253)  Compared to the patient's condition at the START of treatment, this patient's condition is:: 3 (04/08/19 1253)         Precautions:     Behavioral Orders   Procedures     Code 1 - Restrict to Unit     [email or web address]     MMPI 2     Routine Programming     As clinically indicated     Status 15     Every 15 minutes.     Suicide precautions     Patients on Suicide Precautions should have a Combination Diet ordered that includes a Diet selection(s) AND a Behavioral Tray selection for Safe Tray - with utensils, or Safe Tray - NO utensils            DIagnoses:      Bipolar disorder type I-recent episode mixed (provisional)  Stimulant use disorder, amphetamine type substance, severe  Cannabis use disorder, moderate         Plan:     Continue Abilify for mood stabilization.Tolerating the medication without side effects.    Hepatitis C: New diagnosis.  Awaiting confirmatory results.  Plan for outpatient follow-up.    Psychosocial treatments to be addressed with social work consult and groups.    Psychology consultation and neuropsychological testing complete and results have been reviewed with the patient.    Legal status: Now on a stay of commitment.    Disposition: The patient is requesting to return home with family while engaging in an outpatient MI CD treatment  program.  We will await confirmation from his family that he is able to return home which could occur as early as today..

## 2019-04-17 NOTE — PROGRESS NOTES
Re: Rule 25 assessment    Writer met with pt to complete RUle 25 assessment.  Pt collaborated in the interview.  He was assessed at the residential level of care.  He signed MAHI's for two MI/CD programs (contact info listed below) and the assessment and MAHI's were sent by fax to the programs directly as the pt has [employer] insurance.  CTC apprised.    Programs for follow up:  [facility] / [place]: [phone]/fax [phone]  [facility]: [phone]/fax [phone]

## 2019-04-17 NOTE — PROGRESS NOTES
Tani had a typical, unremarkable evening. He was calm, presented with a brighter, more full range affect, was social with his roommate and peers on the unit, and visible throughout the shift. He spent time using the telephone, watching TV/movies and pacing the hallways. Tani currently denies SI/SIB and hallucinations. He states he believes the plan is to leave later on this week but plans to confirm this with his  and doctor tomorrow. He is also looking forward to receiving outpatient treatment from [facility]. He reports some boredom and anxiety due to his long stay in the hospital. Tani showered this evening. ADL's are independent, no nutrition concerns. He did not attend or participate in any available programming (Recreation group and [employer]). No visitors.        04/16/19 2116   Behavioral Health   Hallucinations denies / not responding to hallucinations   Thinking poor concentration;distractable   Orientation time: oriented;date: oriented;place: oriented;person: oriented   Memory baseline memory   Insight poor   Judgement impaired   Eye Contact at examiner   Affect full range affect   Mood mood is calm;anxious   Physical Appearance/Attire neat;attire appropriate to age and situation;appears stated age   Hygiene well groomed;other (see comment)  (Showered this evening)   Suicidality other (see comments)  (Patient denies)   1. Wish to be Dead No   2. Non-Specific Active Suicidal Thoughts  No   Self Injury other (see comment)  (Patient denies)   Elopement Statements about wanting to leave  (No indicators this shift )   Activity other (see comment)  (Visible in milieu, social with peers)   Speech coherent;clear   Medication Sensitivity no stated side effects;no observed side effects   Psychomotor / Gait balanced;steady;paces   Psycho Education   Type of Intervention 1:1 intervention   Response participates, initiates socially appropriate   Hours 0.5   Treatment Detail Check-In   Activities of  Daily Living   Hygiene/Grooming independent;shower;handwashing   Oral Hygiene independent   Dress street clothes;independent;scrubs (behavioral health)   Laundry with supervision   Room Organization independent

## 2019-04-17 NOTE — PROGRESS NOTES
"Tani was quite agitated early in the evening shift around 1615, and demanded to speak with , Sue. She had spoken to him earlier and had already left for the day. He was upset that his parents won't take him back to live at their home. He said he has a girlfriend & could probably stay with her, but cannot get a hold of her. He states he has some other phone numbers in his cell phone, but \"it is lost somewhere at my parent's house, they told me they can't find it... But I don't know if my mom is lying to me.\" He is quite upset that his discharge is being delayed. I told him he would have to speak with the MD and Clau tomorrow. I offered him Vistaril, which he took and calmed down. He also began socializing with peers, which kept him occupied and more calm.   "
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
## 2019-04-17 NOTE — PROGRESS NOTES
"  We discussed pt's situation in team meeting to sort this plan out.    Pt asked to see me.    He began the conversation with something that could look like paranoia. He said he father was trying to throw him off somehow as his father deleted the girlfriends number and is making his phone have a busy signal and various other things.    We got the conversation back on track about his discharge plans. He said \"so the plan is off now about going somewhere and then to [facility] and I said it was.   Pt said this was all happening because it it what his father wants. I said this is happening because it is what the court wants and what the recommendation is. I said that OP services would only work if there was a supportive home and there is not.  I stated that we would get a CD assessment and look at MI- residential treatment such as [place]. I asked if he had ever been there and he said he had never been to any of these places. He said he already had an assessment and he didn't need this. His further comments led me to think he as talking about the CD piece of the petiition not being supported. I explained that he does have to achieve and maintain sobriety.   Pt sarcastically said \"alright then.\"    JOY Boles, is coming to do assessment. She has completed the CD assessment and has referred the pt to the [facility] and [facility]. [facility] Deirdre at fax: [phone])has called me and asked for the commitment paperwork from tomorrow's hearing.    I heard pt talking to another pt in the [facility] that sounded like he was suggesting that there was malicious intent with some of the unit operations. It appears that the pt continues with some level of paranoia. Upon discussion with other staff members, there is agreement with this characterization of pt's presentation.  "

## 2019-04-18 PROCEDURE — 99232 SBSQ HOSP IP/OBS MODERATE 35: CPT | Performed by: PSYCHIATRY & NEUROLOGY

## 2019-04-18 PROCEDURE — 12400001 ZZH R&B MH UMMC

## 2019-04-18 PROCEDURE — 25000132 ZZH RX MED GY IP 250 OP 250 PS 637: Performed by: PSYCHIATRY & NEUROLOGY

## 2019-04-18 RX ORDER — ARIPIPRAZOLE 10 MG/1
10 TABLET ORAL DAILY
Status: DISCONTINUED | OUTPATIENT
Start: 2019-04-19 | End: 2019-04-22 | Stop reason: HOSPADM

## 2019-04-18 RX ADMIN — GABAPENTIN 100 MG: 100 CAPSULE ORAL at 08:32

## 2019-04-18 RX ADMIN — GABAPENTIN 100 MG: 100 CAPSULE ORAL at 20:31

## 2019-04-18 RX ADMIN — ARIPIPRAZOLE 5 MG: 5 TABLET ORAL at 08:32

## 2019-04-18 RX ADMIN — NICOTINE POLACRILEX 4 MG: 2 GUM, CHEWING BUCCAL at 20:31

## 2019-04-18 RX ADMIN — TRAZODONE HYDROCHLORIDE 50 MG: 50 TABLET ORAL at 23:25

## 2019-04-18 RX ADMIN — NICOTINE POLACRILEX 4 MG: 2 GUM, CHEWING BUCCAL at 16:52

## 2019-04-18 RX ADMIN — GABAPENTIN 100 MG: 100 CAPSULE ORAL at 14:16

## 2019-04-18 RX ADMIN — NICOTINE POLACRILEX 4 MG: 2 GUM, CHEWING BUCCAL at 14:16

## 2019-04-18 ASSESSMENT — ACTIVITIES OF DAILY LIVING (ADL)
DRESS: INDEPENDENT
HYGIENE/GROOMING: INDEPENDENT
ORAL_HYGIENE: INDEPENDENT

## 2019-04-18 ASSESSMENT — MIFFLIN-ST. JEOR: SCORE: 1770.48

## 2019-04-18 NOTE — PROGRESS NOTES
"    Pt was supposed to go to court today. There was some confusion/misinformation and the transport was cancelled. After many discussions with [facility] staff and 's , pt has received a stay of commitment. The pt's  came here to have him sign this.The terms are such that he needs to stay here or at detox until he is placed. He needs to go directly to treatment. He needs to take medication and refrain from alcohol and drugs.    I will wait for the court order.    I met with pt. He was quite agitated and railed against the system and his mother. Pt blames all his problems on mother. Pt says he is homeless as his mother drinks all day. She wouldn't drive him to work so he lost his [employer] job. Pt told me he is not agreeing to go to residential treatment. I asked what his plan was if her were going to outpatient treatment and he said he would sleep in a dumpster behind a treatment center. He says he won't go for 3 months, he'll leave, He says he will go for one month as long as he can take his nicotine vaporizer. He says \"the clock is just being run out.\"  I asked him if ne needed treatment and he said \"I don't think so.\"  Pt stated nothing was like he requested, \"everything is moving backward and sidewise.\" \"the hospital, treatment center, and insurance company are all benefiting from each other.\"    Pt said that he needed time on the computer to let people know he is going away. He said he could not contact his girlfriend as he doesn't have his phone and they use facebook to communicate. I stated that I could check with the team and if people were comfortable I could monitor him on facebook. He declined this talking about security features he has on his facebook through his phone.    [facility] has declined pt on the basis of too severe mental illness.    [facility] called ([phone]) and Latanya said they were sending this on to review for [place], [place], [facility], [facility], " and [facility]. She will want the stay of commitment paperwork. She asks me to fax the assessment to Neil @ [phone]. I did fax this.

## 2019-04-18 NOTE — PROGRESS NOTES
"[facility], [place]   Psychiatric Progress Note        Interim History:   The patient's care was discussed with the treatment team during the daily team meeting and/or staff's chart notes were reviewed.  Staff report: No acute issues.  Slept well.  Eating meals.  Taking medications. No aggression or hostility.    No complaints today.  He inquired if he can have a copy of his neuropsychological testing.  He seemed more agreeable to increase the dose of Abilify today.    He tells me that he is more willing to attend residential treatment however is hoping for a 30-day treatment course as opposed to a 90-day treatment course.     Patient denied auditory and visual hallucinations.  He slept well last night and did not report vegetative symptoms.  He denied feeling helpless or hopeless. Denied anhedonia.           Medications:       ARIPiprazole  5 mg Oral Daily     gabapentin  100 mg Oral TID          Allergies:   No Known Allergies       Labs:   No results found for this or any previous visit (from the past 24 hour(s)).       Psychiatric Examination:     /74   Pulse 88   Temp 97.5  F (36.4  C) (Tympanic)   Resp 16   Ht 1.803 m (5' 11\")   Wt 78.8 kg (173 lb 12.8 oz)   SpO2 98%   BMI 24.24 kg/m    Weight is 173 lbs 12.8 oz  Body mass index is 24.24 kg/m .  Orthostatic Vitals       Most Recent      Sitting Orthostatic /81 04/07 1616    Sitting Orthostatic Pulse (bpm) 83 04/07 1616            Appearance: awake, alert  Attitude:  cooperative  Eye Contact:  better  Mood:  better  Affect:  appropriate and in normal range  Speech:  pressured speech  Psychomotor Behavior:  no evidence of tardive dyskinesia, dystonia, or tics  Throught Process:  tangental, mild  Associations:  no loose associations  Thought Content:  no evidence of suicidal ideation or homicidal ideation and There seemed to be a prominent theme of paranoid concerns.,  Mild  Insight:  partial  Judgement:  " limited  Oriented to:  time, person, and place  Attention Span and Concentration:  intact  Recent and Remote Memory:  fair    Clinical Global Impressions  First:  Considering your total clinical experience with this particular patient population, how severe are the patient's symptoms at this time?: 7 (04/08/19 1253)  Compared to the patient's condition at the START of treatment, this patient's condition is:: 3 (04/08/19 1253)  Most recent:  Considering your total clinical experience with this particular patient population, how severe are the patient's symptoms at this time?: 7 (04/08/19 1253)  Compared to the patient's condition at the START of treatment, this patient's condition is:: 3 (04/08/19 1253)         Precautions:     Behavioral Orders   Procedures     Code 1 - Restrict to Unit     Ambient Control Systemson     MMPI 2     Routine Programming     As clinically indicated     Status 15     Every 15 minutes.     Suicide precautions     Patients on Suicide Precautions should have a Combination Diet ordered that includes a Diet selection(s) AND a Behavioral Tray selection for Safe Tray - with utensils, or Safe Tray - NO utensils            DIagnoses:      Bipolar disorder type I-recent episode mixed (provisional)  Stimulant use disorder, amphetamine type substance, severe  Cannabis use disorder, moderate  Rule out a paranoid personality disorder         Plan:     Increase Abilify to 10 mg daily targeting an anticipated therapeutic dose for mood stabilization.    Hepatitis C: New diagnosis.  Awaiting confirmatory results.  Plan for outpatient follow-up.    Psychosocial treatments to be addressed with social work consult and groups.    Psychology consultation and neuropsychological testing complete and results have been reviewed with the patient.  Chemical health assessment was completed yesterday.    Legal status: Pursuing a stay of commitment.    Disposition: Exploring residential MI/CD treatment facilities.

## 2019-04-18 NOTE — PROGRESS NOTES
Patient intermittently visible and active in the milieu.  Peer interactions congenial and respectful.  Program engagement selective (2 of the 5 groups).  Responsive to staff inquiries and interventions.  Presenting today as generally alert, calm, focused, and cooperative.  Currently denying all significant MH issues, concerns, and acuities, including depression, anxiety, SI, SIB urges, AH's, etc.  Primary focus of interest today is his aftercare disposition in the context of his commitment process, which he claims has now been dropped.  He is hopeful that he will eventually work through his tangled life issues.   Jamie Hogan   04/18/2019

## 2019-04-19 PROCEDURE — H2032 ACTIVITY THERAPY, PER 15 MIN: HCPCS

## 2019-04-19 PROCEDURE — 12400001 ZZH R&B MH UMMC

## 2019-04-19 PROCEDURE — 25000132 ZZH RX MED GY IP 250 OP 250 PS 637: Performed by: PSYCHIATRY & NEUROLOGY

## 2019-04-19 RX ADMIN — GABAPENTIN 100 MG: 100 CAPSULE ORAL at 14:00

## 2019-04-19 RX ADMIN — NICOTINE POLACRILEX 4 MG: 2 GUM, CHEWING BUCCAL at 10:56

## 2019-04-19 RX ADMIN — ARIPIPRAZOLE 10 MG: 10 TABLET ORAL at 07:48

## 2019-04-19 RX ADMIN — NICOTINE POLACRILEX 4 MG: 2 GUM, CHEWING BUCCAL at 07:48

## 2019-04-19 RX ADMIN — GABAPENTIN 100 MG: 100 CAPSULE ORAL at 22:16

## 2019-04-19 RX ADMIN — GABAPENTIN 100 MG: 100 CAPSULE ORAL at 07:48

## 2019-04-19 RX ADMIN — NICOTINE POLACRILEX 4 MG: 2 GUM, CHEWING BUCCAL at 15:22

## 2019-04-19 RX ADMIN — TRAZODONE HYDROCHLORIDE 50 MG: 50 TABLET ORAL at 22:16

## 2019-04-19 RX ADMIN — HYDROXYZINE HYDROCHLORIDE 25 MG: 25 TABLET ORAL at 22:16

## 2019-04-19 RX ADMIN — NICOTINE POLACRILEX 4 MG: 2 GUM, CHEWING BUCCAL at 22:16

## 2019-04-19 RX ADMIN — NICOTINE POLACRILEX 4 MG: 2 GUM, CHEWING BUCCAL at 18:55

## 2019-04-19 ASSESSMENT — ACTIVITIES OF DAILY LIVING (ADL)
HYGIENE/GROOMING: INDEPENDENT
LAUNDRY: UNABLE TO COMPLETE
DRESS: INDEPENDENT
ORAL_HYGIENE: INDEPENDENT

## 2019-04-19 NOTE — PROGRESS NOTES
"    Pt approached me as soon as I got in this morning. He wanted to change the plan to going to OP services and to his girlfriends house. Pt attempted to argue about the plan and said he would talk to his .  Later RN approached me saying the pt wants discharge to his girlfriends home today. RN understands that pt can talk to his  but that is not the plan agreed upon.      Pt approached me a second time as I was placing his court order in the chart at the desk. He asked if I had heard anything from the 5 places. I said that I had not.      I received the \"Stayed Order for Commitment as a Person who is Mentally Ill and Chemically Dependent.\"  \"respondent is committed as a person who is mentally ill and chemically dependent to the [facility] and to the Commissioner of Human Services.\"  \"The forgoing commitment is stayed to October 18, 2019 on condition that Respondent:\"  The conditions are then laid out a - g.    It might be therapeutic to go over these with the pt.      "

## 2019-04-19 NOTE — PROGRESS NOTES
04/18/19 2154   Behavioral Health   Hallucinations denies / not responding to hallucinations   Thinking distractable   Orientation time: oriented;date: oriented;place: oriented;person: oriented   Memory new learning, recall loss   Insight insight appropriate to situation;poor;insight appropriate to events   Judgement impaired   Eye Contact at examiner   Affect tense;full range affect;blunted, flat   Mood mood is calm;anxious   Physical Appearance/Attire neat;attire appropriate to age and situation;appears stated age   Hygiene other (see comment)  (adequate)   Suicidality other (see comments)  (None)   1. Wish to be Dead No   2. Non-Specific Active Suicidal Thoughts  No   Self Injury other (see comment)  (None)   Activity other (see comment)  (social with others)   Speech clear;coherent   Medication Sensitivity no stated side effects;no observed side effects   Psychomotor / Gait steady;balanced     Pt. Plans to live with girlfriend. Pt. Unsure if treatment is the right fit as would like to focus on the new diagnosis of Bipolar. Pt. Interested in outpatient. Pt. Unsure what the discharge plan would be as wasn't sure if the team and parents were able to come to an agreement. Pt. Interested in discussing discharge for tomorrow. Pt. Attempted to get in contact with  this evening to see how his plan would be affected with living with girlfriend but unable to get in contact.

## 2019-04-19 NOTE — PROGRESS NOTES
Pt was a bit guarded, possibly some social anxiety, but responded well when supported and welcomed.       04/19/19 1330   Dance Movement Therapy   Type of Intervention structured groups   Response participates with encouragement   Hours 1

## 2019-04-19 NOTE — PROGRESS NOTES
Results of Psych Testing:  Tani said that his Doctor was going to give him a copy of his Psych Testing when avaliable, and Tani would like a copy as soon as possible.

## 2019-04-19 NOTE — PROGRESS NOTES
Tani asked writer to make a note for his MD and CTC indicating he has spoken with his girlfriend and she has agreed to allow Tani to live with her. Writer informed pt of his stay of commitment and that his discharge plan would most likely be directly to CD treatment. Tani said he would like to discuss with his CTC tomorrow.

## 2019-04-19 NOTE — PLAN OF CARE
"48 hour nursing assessment    SI/SIB/HI: denies  Hallucinations: pt denies  Depression: pt did not report  Anxiety: pt did not report, though does appear anxious regarding wish to discharge  Other symptoms reported: pt is irritable and reports feeling \"very frustrated\" that he is unable to discharge today. Pt expresses distrust of his care team and the hospital system.    Shift summary: pt began the shift with polite affect and calm mood, became increasingly irritable and tense when informed that he would not be able to leave today. Pt spoke briefly with writer, mainly expressing frustrations over being \"stuck here on my ass while everyone gives me the run around\". Pt did not want to speak any further at that point so writer is unable to completely assess MH symptoms at this time. Pt has remained calm though irritated, no behavioral concerns. Pt is medication adherent. Pt is appropriately social with select peers. No other concerns at this time. Nursing will continue to monitor and assess.    "

## 2019-04-20 PROCEDURE — 12400001 ZZH R&B MH UMMC

## 2019-04-20 PROCEDURE — 25000132 ZZH RX MED GY IP 250 OP 250 PS 637: Performed by: PSYCHIATRY & NEUROLOGY

## 2019-04-20 RX ADMIN — NICOTINE POLACRILEX 4 MG: 2 GUM, CHEWING BUCCAL at 16:36

## 2019-04-20 RX ADMIN — TRAZODONE HYDROCHLORIDE 50 MG: 50 TABLET ORAL at 20:51

## 2019-04-20 RX ADMIN — GABAPENTIN 100 MG: 100 CAPSULE ORAL at 13:41

## 2019-04-20 RX ADMIN — HYDROXYZINE HYDROCHLORIDE 25 MG: 25 TABLET ORAL at 20:17

## 2019-04-20 RX ADMIN — NICOTINE POLACRILEX 4 MG: 2 GUM, CHEWING BUCCAL at 13:41

## 2019-04-20 RX ADMIN — NICOTINE POLACRILEX 4 MG: 2 GUM, CHEWING BUCCAL at 10:09

## 2019-04-20 RX ADMIN — NICOTINE POLACRILEX 4 MG: 2 GUM, CHEWING BUCCAL at 20:17

## 2019-04-20 RX ADMIN — GABAPENTIN 100 MG: 100 CAPSULE ORAL at 08:05

## 2019-04-20 RX ADMIN — TRAZODONE HYDROCHLORIDE 50 MG: 50 TABLET ORAL at 20:17

## 2019-04-20 RX ADMIN — NICOTINE POLACRILEX 4 MG: 2 GUM, CHEWING BUCCAL at 18:08

## 2019-04-20 RX ADMIN — GABAPENTIN 100 MG: 100 CAPSULE ORAL at 20:17

## 2019-04-20 RX ADMIN — ARIPIPRAZOLE 10 MG: 10 TABLET ORAL at 08:05

## 2019-04-20 RX ADMIN — NICOTINE POLACRILEX 4 MG: 2 GUM, CHEWING BUCCAL at 08:05

## 2019-04-20 ASSESSMENT — ACTIVITIES OF DAILY LIVING (ADL)
LAUNDRY: WITH SUPERVISION
ORAL_HYGIENE: INDEPENDENT
HYGIENE/GROOMING: INDEPENDENT
DRESS: INDEPENDENT
ORAL_HYGIENE: INDEPENDENT
DRESS: SCRUBS (BEHAVIORAL HEALTH);INDEPENDENT
HYGIENE/GROOMING: HANDWASHING;SHOWER;INDEPENDENT

## 2019-04-20 NOTE — PROGRESS NOTES
Pt was in a good mood today. He spent his time watching movies, walking in the hallway, and socializing with peers at times. He is frustrated about being here. He was calm and pleasant with staff. He doesn't want to go to an inpatient treatment center, though he is willing to do so to be able to leave. He is just waiting for a bed to open up at this point. He denies any SI, SIB, or hallucinations. His goal was to keep a positive attitude, which he was able to do.     04/20/19 1400   Behavioral Health   Hallucinations denies / not responding to hallucinations   Thinking poor concentration   Orientation person: oriented;place: oriented;date: oriented;time: oriented   Memory baseline memory   Insight insight appropriate to situation;insight appropriate to events   Judgement intact   Eye Contact at examiner   Affect full range affect   Mood mood is calm   Physical Appearance/Attire appears stated age;attire appropriate to age and situation   Hygiene well groomed   Suicidality other (see comments)  (denies)   1. Wish to be Dead No   2. Non-Specific Active Suicidal Thoughts  No   Self Injury other (see comment)  (denies)   Elopement   (none)   Activity other (see comment)  (visible in milieu, social with peers)   Speech clear;coherent   Medication Sensitivity no stated side effects;no observed side effects   Psychomotor / Gait balanced;steady   Activities of Daily Living   Hygiene/Grooming independent   Oral Hygiene independent   Dress independent   Room Organization independent

## 2019-04-21 PROCEDURE — 25000132 ZZH RX MED GY IP 250 OP 250 PS 637: Performed by: PSYCHIATRY & NEUROLOGY

## 2019-04-21 PROCEDURE — 12400001 ZZH R&B MH UMMC

## 2019-04-21 RX ADMIN — GABAPENTIN 100 MG: 100 CAPSULE ORAL at 08:51

## 2019-04-21 RX ADMIN — ARIPIPRAZOLE 10 MG: 10 TABLET ORAL at 08:51

## 2019-04-21 RX ADMIN — NICOTINE POLACRILEX 4 MG: 2 GUM, CHEWING BUCCAL at 20:44

## 2019-04-21 RX ADMIN — NICOTINE POLACRILEX 4 MG: 2 GUM, CHEWING BUCCAL at 15:16

## 2019-04-21 RX ADMIN — NICOTINE POLACRILEX 4 MG: 2 GUM, CHEWING BUCCAL at 18:41

## 2019-04-21 RX ADMIN — NICOTINE POLACRILEX 4 MG: 2 GUM, CHEWING BUCCAL at 17:45

## 2019-04-21 RX ADMIN — NICOTINE POLACRILEX 4 MG: 2 GUM, CHEWING BUCCAL at 08:51

## 2019-04-21 RX ADMIN — ACETAMINOPHEN 650 MG: 325 TABLET, FILM COATED ORAL at 09:59

## 2019-04-21 RX ADMIN — TRAZODONE HYDROCHLORIDE 50 MG: 50 TABLET ORAL at 20:44

## 2019-04-21 RX ADMIN — HYDROXYZINE HYDROCHLORIDE 25 MG: 25 TABLET ORAL at 20:44

## 2019-04-21 RX ADMIN — GABAPENTIN 100 MG: 100 CAPSULE ORAL at 20:44

## 2019-04-21 RX ADMIN — GABAPENTIN 100 MG: 100 CAPSULE ORAL at 15:16

## 2019-04-21 RX ADMIN — NICOTINE POLACRILEX 4 MG: 2 GUM, CHEWING BUCCAL at 12:58

## 2019-04-21 ASSESSMENT — ACTIVITIES OF DAILY LIVING (ADL)
HYGIENE/GROOMING: INDEPENDENT
DRESS: INDEPENDENT
ORAL_HYGIENE: INDEPENDENT
LAUNDRY: WITH SUPERVISION

## 2019-04-21 ASSESSMENT — MIFFLIN-ST. JEOR: SCORE: 1789.53

## 2019-04-21 NOTE — PROGRESS NOTES
Pt appeared preoccupied this evening.  He would withdraw to his room to nap and was seldomly observed socializing with peers during times other than dinner and snacks.  He currently denies SI and SIB urges/thoughts.  He's awaiting CD placement.       04/20/19 2209   Sleep/Rest/Relaxation   Day/Evening Time Hours napping;resting in bed   Number of hours napping 1 hours   Number of hours resting in bed 1 hours   Behavioral Health   Hallucinations denies / not responding to hallucinations   Thinking poor concentration;distractable   Orientation person: oriented;place: oriented   Memory baseline memory   Insight poor   Judgement impaired   Eye Contact at examiner   Affect full range affect   Mood mood is calm   Physical Appearance/Attire appears stated age;attire appropriate to age and situation   Hygiene well groomed   Suicidality other (see comments)  (Currently denies SI.)   1. Wish to be Dead No   2. Non-Specific Active Suicidal Thoughts  No   Self Injury other (see comment)  (Currently denies SIB urges/thoughts.)   Elopement Statements about wanting to leave   Activity isolative;withdrawn   Speech clear;coherent   Medication Sensitivity no stated side effects;no observed side effects   Psychomotor / Gait balanced;steady   Coping/Psychosocial Interventions   Supportive Measures active listening utilized;relaxation techniques promoted;self-responsibility promoted;verbalization of feelings encouraged   Activities of Daily Living   Hygiene/Grooming handwashing;shower;independent   Oral Hygiene independent   Dress scrubs (behavioral health);independent   Laundry with supervision   Room Organization independent   Activity   Activity Assistance Provided independent   Hygiene Care Assistance   Hygiene Assistance independent

## 2019-04-21 NOTE — PROGRESS NOTES
04/21/19 1500   Behavioral Health   Hallucinations denies / not responding to hallucinations   Orientation person: oriented;place: oriented;date: oriented;time: oriented   Memory baseline memory   Eye Contact at examiner   Affect full range affect   Mood mood is calm   Physical Appearance/Attire attire appropriate to age and situation   Hygiene well groomed   Suicidality other (see comments)  (Denies.)   1. Wish to be Dead No   2. Non-Specific Active Suicidal Thoughts  No   Elopement   (Pt. is waiting for possible out-pt or in-pt Dual DX Tx progr)   Activity other (see comment)  (Out in milieu, Joined another pt.'s visit with that Pt.'s mo)   Speech clear;coherent   Medication Sensitivity no observed side effects;other (see comment)  (Pt is concerned about increased appetite & possible weight g)

## 2019-04-22 VITALS
BODY MASS INDEX: 24.92 KG/M2 | WEIGHT: 178 LBS | DIASTOLIC BLOOD PRESSURE: 56 MMHG | SYSTOLIC BLOOD PRESSURE: 105 MMHG | HEIGHT: 71 IN | OXYGEN SATURATION: 97 % | TEMPERATURE: 98.3 F | RESPIRATION RATE: 16 BRPM | HEART RATE: 76 BPM

## 2019-04-22 PROCEDURE — 99239 HOSP IP/OBS DSCHRG MGMT >30: CPT | Performed by: PSYCHIATRY & NEUROLOGY

## 2019-04-22 PROCEDURE — 25000132 ZZH RX MED GY IP 250 OP 250 PS 637: Performed by: PSYCHIATRY & NEUROLOGY

## 2019-04-22 PROCEDURE — 25000132 ZZH RX MED GY IP 250 OP 250 PS 637: Performed by: CLINICAL NURSE SPECIALIST

## 2019-04-22 RX ORDER — GABAPENTIN 100 MG/1
100 CAPSULE ORAL 3 TIMES DAILY
Qty: 90 CAPSULE | Refills: 0 | Status: SHIPPED | OUTPATIENT
Start: 2019-04-22 | End: 2022-04-13

## 2019-04-22 RX ORDER — ARIPIPRAZOLE 10 MG/1
10 TABLET ORAL DAILY
Qty: 30 TABLET | Refills: 0 | Status: SHIPPED | OUTPATIENT
Start: 2019-04-23 | End: 2022-04-13

## 2019-04-22 RX ADMIN — NICOTINE POLACRILEX 4 MG: 2 GUM, CHEWING BUCCAL at 11:18

## 2019-04-22 RX ADMIN — GABAPENTIN 100 MG: 100 CAPSULE ORAL at 14:56

## 2019-04-22 RX ADMIN — ARIPIPRAZOLE 10 MG: 10 TABLET ORAL at 08:00

## 2019-04-22 RX ADMIN — NICOTINE POLACRILEX 4 MG: 2 GUM, CHEWING BUCCAL at 08:00

## 2019-04-22 RX ADMIN — OLANZAPINE 5 MG: 5 TABLET, FILM COATED ORAL at 11:21

## 2019-04-22 RX ADMIN — NICOTINE POLACRILEX 4 MG: 2 GUM, CHEWING BUCCAL at 14:56

## 2019-04-22 RX ADMIN — GABAPENTIN 100 MG: 100 CAPSULE ORAL at 08:00

## 2019-04-22 NOTE — PROGRESS NOTES
"Patient has spent majority of the evening in the [facility] area social with select peers and watching movies. Ate dinner and was med compliant.  Patient denies suicidal ideation and self injurious thoughts. Patient reports being a bit frustrated about not being able to do outpatient treatment. Pleasant and cooperative on the unit.     Patient evaluation continues.     Assessed mood,anxiety,thoughts and behavior.     Patient gradually progressing towards goals.    Patient is encouraged to participate in groups and assisted to develop healthy coping skills.     VS reviewed: /56   Pulse 76   Temp 98.8  F (37.1  C) (Tympanic)   Resp 16   Ht 1.803 m (5' 11\")   Wt 80.7 kg (178 lb)   SpO2 97%   BMI 24.83 kg/m      Length of stay: 16    Refer to daily team meeting notes for individualized plan of care. Nursing will continue to assess.      "

## 2019-04-22 NOTE — DISCHARGE SUMMARY
[facility]  Department of Psychiatry    DATE OF ADMISSION:  4/5/2019    DATE OF DISCHARGE:  4/22/2019    DISCHARGE DIAGNOSES:   Bipolar disorder type I-recent episode mixed (provisional)  Stimulant use disorder, amphetamine type substance, severe  Cannabis use disorder, moderate  Rule out a paranoid personality disorder    HOSPITAL COURSE: (Refer to H&P, progress notes, and consult notes for details)    The patient was admitted to our Behavioral Health Unit under a 72-hour hold for symptoms of mauri and psychosis in the context of substance usage.  The patient was initially reluctant to accept the recommended treatment plan prompting concern for his safety and prompting initiation of a petition for commitment.  During the evaluation process, the patient became more cooperative with this plan of care.  He became more amenable to pursue chemical addiction treatment and was accepting of mood stabilizing medications.  Despite several days passing since his last usage of stimulants, prominent paranoia and manic symptoms persisted.  He was agreeable for neuropsychological testing to further explore the possibility of an underlying mental health condition and/or personality disorder.  Neuropsychological testing was completed and favored a bipolar disorder while noting prominent paranoia.  The patient was started on Abilify and gradually titrated up to the targeted dose for mood stabilization and reduction of paranoia.  Gabapentin was utilized for reduction of anxiety.  His mood gradually improved and paranoia reduced.  He completed a chemical health assessment and referrals were initiated for residential treatment.  Given his cooperation with this plan of care, a stay of commitment was later agreed upon through the court examination.  Once a bed became available to him at a residential treatment facility, his care was transitioned directly there while under a stay of commitment for MI  CD treatment.    During his hospital stay, routine blood testing revealed a positive hepatitis screen.  He was seen by the internal medicine service to further discuss the test results along with pursuit of confirmation testing and future follow-up and general education pertaining to the diagnosis..    Other interventions received during his hospitalization included:   Psychosocial treatments were addressed with groups, social work consult, and supportive milieu provided by staff.    CONDITION AT DISCHARGE:  Improved.  The patients acute suicide risk is low due to the following factors:  improved mood/anxiety symptoms.  Denies suicidal ideations. Denies psychotic symptoms.  Not actively intoxicated and plans to abstain from illicit substances and alcohol.  Denies access to guns.  Denies feeling hopeless or helpless. At the time of discharge Tani Ramirez was determined to not be an immediate danger to herself or others. The patient's acute risk will be higher if noncompliant with treatment plan, medications, follow-up or using illicit substances or alcohol.  These findings along with the risks of noncompliance with medications and treatment plan, which could potentially cause decompensation and increase the risk for suicide, were discussed with the patient.  The patients chronic suicide risk is moderate given the following factors: white race; diagnosis of Bipolar disorder, history of chemical dependency; Denied a family history of suicide.  Preventative factors include: social supports     MENTAL STATUS EXAMINATION AT TIME OF DISCHARGE:  The patient is 28 year old White male who appears their stated age and is appropriately dressed with good hygiene.  Calm and cooperative with the interview questions.  No psychomotor abnormalities are noted. Eye contact is appropriate. Speech has normal rate, tone, latency and volume and is not pushed or pressured. Mood is euthymic and affect is full and appropriate.  The  patient does not seem overtly depressed, anxious, manic or irritable.  Thought process is linear, logical and future oriented.  Thought process is not tangential, circumstantial or disorganized.  Thought content is not significant for apperant paranoia, delusions, ideas of reference or grandiosity.  The patient denies suicidal and homicidal ideations as well as auditory and visual hallucinations.  Insight and judgment are fair.  Cognition appears intact to interviewing including orientation, recent and remote memory, fund of knowledge, use of language, attention span and concentration.  Muscle strength, tone and gait appear normal on visual inspection.      DISPOSITION:  The patient is discharged to [facility] for residential MI CD treatment.    FOLLOWUP APPOINTMENTS:  ( per social workers notes and after visit summary)  Divya Madrigal @ [phone] was your court . She is referring you to an ongoing [facility] adult mental health .  The Health Unit Coordinator is faxing these discharge instructions to fax: [phone]      You are being admitted to a substance use treatment center.  [facility] [place] [address]   is Angela simmons Ph: [phone]  Main Access Number is [phone]  The Health Unit Coordinator has faxed these discharge instructions to fax: [phone]     You are newly diagnosed with Hepatitis C. Please attend this appointment on Thursday, May 9, 2019 at 8:30AM to follow up.  May 09, 2019  8:30 AM CDT  (Arrive by 8:15 AM)  New General Liver with Thomas Michael Leventhal, MD  [facility] ([facility]) [address]  [address]  [facility] [address] [phone]         Attend your new patient psychiatric medication management appointment with Edgardo Ricks PA-C  on Monday, Asia 3, 2019 at 10AM with check in at 9:15AM. If this does not  eventually work out for you, please make sure to cancel well ahead of time.  [facility]  [address] [place], [place]  Phone: [phone]  The Health Unit Coordinator has faxed these discharge instructions to [phone]      DISCHARGE MEDICATIONS:   Discharge Medication List as of 4/22/2019  3:51 PM      START taking these medications    Details   !! ARIPiprazole (ABILIFY) 10 MG tablet Take 1 tablet (10 mg) by mouth daily, Disp-30 tablet, R-0, E-Prescribe      !! ARIPiprazole (ABILIFY) 5 MG tablet Take 1 tablet (5 mg) by mouth daily, Disp-30 tablet, R-0, E-Prescribe       !! - Potential duplicate medications found. Please discuss with provider.      CONTINUE these medications which have CHANGED    Details   gabapentin (NEURONTIN) 100 MG capsule Take 1 capsule (100 mg) by mouth 3 times daily, Disp-90 capsule, R-0, E-Prescribe              LABORATORY RESULTS: (past 14 days)  No results found for this or any previous visit (from the past 336 hour(s)).    >30 minutes was spent on this discharge to allow for reviewing the patient's response to treatment, reviewing plan of care, education on medications and diagnosis, and conducting a risk assessment.

## 2019-04-22 NOTE — PLAN OF CARE
Pt to discharge this shift. Pt reports satisfaction with this plan.   Denies suicidal ideation, self-harm thoughts, and homicidal ideation. Denies AH/VH.  Medications sent to pharmacy, per [facility] patient/tx center to . Plans to follow-up with outpt providers and appts scheduled on AVS. AVS reviewed by previous RN with patient.   Belongings sent with pt. Being transported by treatment center. Staff walked patient to van for transport.

## 2019-04-22 NOTE — DISCHARGE INSTRUCTIONS
Behavioral Discharge Planning and Instructions      Summary:  You were admitted on 4/5/2019  due to symptoms of psychosis including paranoia and brandishing a knife at your parents home.  You were treated by Dr. Raimundo Brown MD. You were on a 72 hour hold.  A petition for commitment was filed.  This was supported by [facility]. You received a stay of commitment. You were discharged on 4/22/2019 from [facility] to [facility] [place]. [facility] is providing transportion. 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   Use [email or web address] Ride - [phone] - for transportation to your health care appointments.      Divya Madrigal @ [phone] was your court . She is referring you to an ongoing targeted adult mental health .  The Health Unit Coordinator is faxing these discharge instructions to fax: [phone]      You are being admitted to a substance use treatment center.  [facility] [place] [address]   is Angela simmons Ph: [phone]  Main Access Number is [phone]  The Health Unit Coordinator has faxed these discharge instructions to fax: [phone]        You are newly diagnosed with Hepatitis C. Please attend this appointment on Thursday, May 9, 2019 at 8:30AM to follow up.  May 09, 2019  8:30 AM CDT  (Arrive by 8:15 AM)  New General Liver with Iván Dahl  Leventhal, MD  [facility] ([facility]) [address]  [address]  [facility] [address] [phone]                 Attend your new patient psychiatric medication management appointment with Edgardo Ricks PA-C  on Monday, Asia 3, 2019 at 10AM with check in at 9:15AM. If this does not eventually work out for you, please make sure to cancel well ahead of time.  [facility]  [address] [place], [place]  Phone: [phone]  The Health Unit Coordinator has faxed these discharge instructions to [phone]          Attend all scheduled appointments with your outpatient providers. Call at least 24 hours in advance if you need to reschedule an appointment to ensure continued access to your outpatient providers.   Major Treatments, Procedures and Findings:  You were provided with: a psychiatric assessment, medication evaluation and/or management, group therapy and milieu management    You completed an MMPI by a testing psychologist. The test results were ruled as invalid.      Internal Medicine consulted on your case.      You tested positive for Hepatitis C.  Hepatitis C Antibody Reactive   Final 04/07/2019 10:56 AM       You requested specialized labs to be drawn given some concerns you were having. These were negative.    You were seen by a registered dietician since you were concerned about the 10 lb weight gain while you were here.      Symptoms to Report: feeling more aggressive, increased confusion or mood getting worse    Early warning signs can include: increased unusual thinking, such as paranoia or hearing voices    Safety and Wellness:  Take all medicines as directed.  Make no changes unless your doctor suggests them.      Follow treatment recommendations.  Refrain from alcohol and non-prescribed drugs.  If there is a concern for safety, call 911.    Resources:   [facility] Crisis (COPE) Response - Adult [phone]      [facility]   - Crisis Beds  1.  [facility] Crisis Stabilization Program  [address] [place] [address]  Phone:[phone]    2.  [facility]  [address] [place] [address]  Phone: [phone]    3.  [facility]  [address] [place] [address]  Phone: [phone]  You want to apply for social security. You were shown that you could go to www.ssa.gov and apply online.      You want to apply for cash assistance. You were shown the list of regional human service centers. You said that this service center below was closest to your home.  [facility], [address]  Map  Hours: Monday-Friday, 8 a.m. to 4:30 p.m.        If you ever need immediate access to services, [facility] has a walk in triage services to help you with what you need. This Triage Center is located in Room N141 at [address] [place] [address]. Go through the front door of [address] [place] [address], and follow signs to N141.   Triage hours:10:00 am - 5:00 pm  Triage Phone Number: [phone]      The treatment team has appreciated the opportunity to work with you.     If you have any questions or concerns our unit number is [phone]  You may be receiving a follow-up phone call within the next three days from a representative from behavioral health.    You have identified the best phone number to reach you as [phone]

## 2019-04-22 NOTE — PROGRESS NOTES
Pt was approved for programs at [facility]. Angela called from [facility] and said there was a bed and they would like to come get him. We made arrangements for them to pick pt up between 3:30 and 4.  Meds are being sent to [facility].  I completed the diagnostic assessment and sent this to the court .       Behavioral Discharge Planning and Instructions      Summary:  You were admitted on 4/5/2019  due to symptoms of psychosis including paranoia and brandishing a knife at your parents home.  You were treated by Dr. Raimundo Brown MD. You were on a 72 hour hold.  A petition for commitment was filed.  This was supported by [facility]. You received a stay of commitment. You were discharged on 4/22/2019 from [facility] to [facility] Abuse Treatment Program at [facility]. [facility] is providing transportion. 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   Use [facility] [employer] Ride - [phone] - for transportation to your health care appointments.      Divya Madrigal @ [phone] was your court . She is referring you to an ongoing targeted adult mental health .  The Health Unit Coordinator is faxing these discharge instructions to fax: [phone]      You are being admitted to a substance use treatment center.  [facility]  [address] [place] [address]   is Angela  is Ph: [phone]  Main Access Number is  [phone]  The Health Unit Coordinator has faxed these discharge instructions to fax: [phone]        You are newly diagnosed with Hepatitis C. Please attend this appointment on Thursday, May 9, 2019 at 8:30AM to follow up.  May 09, 2019  8:30 AM CDT  (Arrive by 8:15 AM)  New General Liver with Thomas Michael Leventhal, MD  [facility] ([facility]) [address]  [address]  [facility] [address] [phone]                 Attend your new patient psychiatric medication management appointment with Edgardo Ricks PA-C  on Monday, Asia 3, 2019 at 10AM with check in at 9:15AM. If this does not eventually work out for you, please make sure to cancel well ahead of time.  [facility]  [address] [place] [address]  Phone: [phone]  The Health Unit Coordinator has faxed these discharge instructions to [phone]          Attend all scheduled appointments with your outpatient providers. Call at least 24 hours in advance if you need to reschedule an appointment to ensure continued access to your outpatient providers.   Major Treatments, Procedures and Findings:  You were provided with: a psychiatric assessment, medication evaluation and/or management, group therapy and milieu management    You completed an MMPI by a testing psychologist. The test results were ruled as invalid.      Internal Medicine consulted on your case.      You tested positive for Hepatitis C.  Hepatitis C Antibody Reactive   Final 04/07/2019 10:56 AM       You requested specialized labs to be drawn given some concerns you were having. These were negative.    You were seen by a registered dietician since you were concerned about the 10 lb weight gain while you were here.      Symptoms to Report: feeling more aggressive, increased confusion or mood getting worse    Early warning signs can include: increased unusual thinking, such as paranoia or hearing voices    Safety and  Wellness:  Take all medicines as directed.  Make no changes unless your doctor suggests them.      Follow treatment recommendations.  Refrain from alcohol and non-prescribed drugs.  If there is a concern for safety, call [phone].    Resources:   [facility] Crisis (COPE) Response - Adult [phone]      [facility]  - Crisis Beds  1.  [facility] Stabilization Program  [address] [place] [address]  Phone:[phone]    2.  [facility]  [address] [place] [address]  Phone: [phone]    3.  [facility]  [address] [place] [address]  Phone: [phone]  You want to apply for social security. You were shown that you could go to www.ssa.gov and apply online.      You want to apply for cash assistance. You were shown the list of regional human service centers. You said that this service center below was closest to your home.  [facility], [address] [place]  Map  Hours: Monday-Friday, 8 a.m. to 4:30 p.m.        If you ever need immediate access to services, [facility] has a walk in triage services to help you with what you need. This Triage Center is located in Room N141 at [address] [place] [address]. Go through the front door of [address] [place] [address], and follow signs to N141.   Triage hours:10:00 am - 5:00 pm  Triage Phone Number: [phone]      The treatment team has appreciated the opportunity to work with you.     If you have any questions or concerns our unit number is [phone]  You may be receiving a follow-up phone call within the next three days from a representative from behavioral health.    You have identified the best phone number to reach you as [phone]

## 2019-04-22 NOTE — PROGRESS NOTES
"[facility], [place]   Psychiatric Progress Note        Interim History:   The patient's care was discussed with the treatment team during the daily team meeting and/or staff's chart notes were reviewed.  Staff report: No acute issues.  Slept well.  Eating meals.  Taking medications. No aggression or hostility.    No complaints today.   Mood remains improved.  He has talked with his mother about a bipolar disorder and seems to be embracing the diagnosis.  She has educated him on the possibility of a rapid cycling bipolar disorder.  He requested a printout of his neuropsychological test to review.    Patient denied auditory and visual hallucinations.  He slept well last night and did not report vegetative symptoms.  He denied feeling helpless or hopeless. Denied anhedonia.           Medications:       ARIPiprazole  10 mg Oral Daily     gabapentin  100 mg Oral TID          Allergies:   No Known Allergies       Labs:   No results found for this or any previous visit (from the past 24 hour(s)).       Psychiatric Examination:     /56   Pulse 76   Temp 98.3  F (36.8  C) (Tympanic)   Resp 16   Ht 1.803 m (5' 11\")   Wt 80.7 kg (178 lb)   SpO2 97%   BMI 24.83 kg/m    Weight is 178 lbs 0 oz  Body mass index is 24.83 kg/m .  Orthostatic Vitals       Most Recent      Sitting Orthostatic /81 04/07 1616    Sitting Orthostatic Pulse (bpm) 83 04/07 1616            Appearance: awake, alert  Attitude:  cooperative  Eye Contact:  better  Mood:  better  Affect:  appropriate and in normal range  Speech:  pressured speech  Psychomotor Behavior:  no evidence of tardive dyskinesia, dystonia, or tics  Throught Process:  tangental, mild  Associations:  no loose associations  Thought Content:  no evidence of suicidal ideation or homicidal ideation and There seemed to be a prominent theme of paranoid concerns.,  Mild  Insight:  partial  Judgement:  limited  Oriented to:  time, person, and " place  Attention Span and Concentration:  intact  Recent and Remote Memory:  fair    Clinical Global Impressions  First:  Considering your total clinical experience with this particular patient population, how severe are the patient's symptoms at this time?: 7 (04/08/19 1253)  Compared to the patient's condition at the START of treatment, this patient's condition is:: 3 (04/08/19 1253)  Most recent:  Considering your total clinical experience with this particular patient population, how severe are the patient's symptoms at this time?: 7 (04/08/19 1253)  Compared to the patient's condition at the START of treatment, this patient's condition is:: 3 (04/08/19 1253)         Precautions:     Behavioral Orders   Procedures     Code 1 - Restrict to Unit     [place]     MMPI 2     Routine Programming     As clinically indicated     Status 15     Every 15 minutes.     Suicide precautions     Patients on Suicide Precautions should have a Combination Diet ordered that includes a Diet selection(s) AND a Behavioral Tray selection for Safe Tray - with utensils, or Safe Tray - NO utensils            DIagnoses:      Bipolar disorder type I-recent episode mixed (provisional)  Stimulant use disorder, amphetamine type substance, severe  Cannabis use disorder, moderate  Rule out a paranoid personality disorder         Plan:     Continue Abilify at the higher dose for mood stabilization.  Tolerating well.    Hepatitis C: New diagnosis.  Awaiting confirmatory results.  Plan for outpatient follow-up.    Psychosocial treatments to be addressed with social work consult and groups.    Psychology consultation and neuropsychological testing complete and results have been reviewed with the patient.  Chemical health assessment was completed yesterday.    Legal status: On a stay of commitment.    Disposition: Exploring residential MI/CD treatment facilities.

## 2019-04-22 NOTE — PROGRESS NOTES
"Pt went to only one group last week - [facility].    Pt signed up to see me at 8:45AM.    Pt did not go to community meeting but rather during that time he rescinded  the MAHI for his father.    We had team meeting. The psychiatrist says he is worried that the pt will leave right away from treatment. He is not worried about the risk of harm to the pt's parents.    I met with pt. He asked what the plan was . I said that nothing has changed from Friday afternoon to Monday morning. He stated \"so I'm just waiting here\" and I say yes. I explained that getting into CD treatment was not an emergency type of service and took some time.    I ask if he has seen the terms of the stay of commitment and he does not think he has.    He asks why he can't go to his girlfriends house. I say that a parental home is more structured and that we have never seen a girlfriend come in here. He says \"what about a friend who is a girl.\"  I reminded him that he had an assessment and it was recommended that he go to residential treatment. I reminded him that he signed the agreement to this effect. He pointed out that the agreement said outpatient at first. I told him he can always talk to his .    RN stated that the pt is asking for another . RN gave him prn medication for agitation. I copied the terms of the stay of commitment and gave these to the MD and RN. MD said that the pt did not bring any of this up with him this morning.    Julia([phone])  is calling asking for updated progress notes.  "

## 2019-05-09 ENCOUNTER — OFFICE VISIT (OUTPATIENT)
Dept: GASTROENTEROLOGY | Facility: CLINIC | Age: 29
End: 2019-05-09
Attending: INTERNAL MEDICINE
Payer: COMMERCIAL

## 2019-05-09 VITALS
SYSTOLIC BLOOD PRESSURE: 137 MMHG | HEART RATE: 78 BPM | WEIGHT: 191.8 LBS | HEIGHT: 71 IN | OXYGEN SATURATION: 95 % | DIASTOLIC BLOOD PRESSURE: 85 MMHG | BODY MASS INDEX: 26.85 KG/M2

## 2019-05-09 DIAGNOSIS — B18.2 CHRONIC HEPATITIS C WITHOUT HEPATIC COMA (H): ICD-10-CM

## 2019-05-09 DIAGNOSIS — B18.2 CHRONIC HEPATITIS C WITHOUT HEPATIC COMA (H): Primary | ICD-10-CM

## 2019-05-09 DIAGNOSIS — F19.10 POLYSUBSTANCE ABUSE (H): ICD-10-CM

## 2019-05-09 LAB
HAV IGG SER QL IA: REACTIVE
HBV CORE AB SERPL QL IA: NONREACTIVE

## 2019-05-09 PROCEDURE — 36415 COLL VENOUS BLD VENIPUNCTURE: CPT | Performed by: INTERNAL MEDICINE

## 2019-05-09 PROCEDURE — 91200 LIVER ELASTOGRAPHY: CPT | Mod: ZF

## 2019-05-09 PROCEDURE — 86708 HEPATITIS A ANTIBODY: CPT | Performed by: INTERNAL MEDICINE

## 2019-05-09 PROCEDURE — G0463 HOSPITAL OUTPT CLINIC VISIT: HCPCS | Mod: ZF

## 2019-05-09 PROCEDURE — 87902 NFCT AGT GNTYP ALYS HEP C: CPT | Performed by: INTERNAL MEDICINE

## 2019-05-09 PROCEDURE — 87522 HEPATITIS C REVRS TRNSCRPJ: CPT | Performed by: INTERNAL MEDICINE

## 2019-05-09 PROCEDURE — 86704 HEP B CORE ANTIBODY TOTAL: CPT | Performed by: INTERNAL MEDICINE

## 2019-05-09 PROCEDURE — 86803 HEPATITIS C AB TEST: CPT | Performed by: INTERNAL MEDICINE

## 2019-05-09 ASSESSMENT — PAIN SCALES - GENERAL: PAINLEVEL: NO PAIN (0)

## 2019-05-09 ASSESSMENT — MIFFLIN-ST. JEOR: SCORE: 1852.13

## 2019-05-09 NOTE — LETTER
5/9/2019       RE: Tani Ramirez  [address]  [place] [address]     Dear Colleague,    Thank you for referring your patient, Tani Ramirez, to the [facility] at [facility]. Please see a copy of my visit note below.    Date of Service: 5/9/2019     Subjective:            Tani Ramirez is a 28 year old male presenting for evaluation of liver disease    History of Present Illness   Tani Ramirez is a 28 year old male with past medical history of polysubstance abuse (including IV and inhaled methamphetamine, cocaine, heroin) with last use greater than 1 month ago, history of generalized anxiety disorder, mood disorder, and paranoia.  He was recently admitted in early April with concerns of acute psychosis felt secondary to substance induced mood disorder which ultimately required a prolonged inpatient psychiatric stay.  During that admission he had labs checked secondary to an elevated ALT and AST, and was found to have hepatitis C with a viral load of approximately 34,000.    He reports that he has used alcohol and drugs since approximately the age of 14.  Notes he does have a history of both IV and inhaled drugs of abuse, including cocaine, heroin, and most significantly methamphetamine.  He does note a history of drinking intermittently of the ages [age], but does note that he got a DUI in 2013 and has had very rare alcohol since.  He notes that his last alcohol was approximately 1 year ago.  He does admit to sharing needles in the past, and does admit to sharing devices for snorting or inhaling drugs in the past as well.  He does admit to having high risk sexual partner who he believes had hepatitis.    He denies issues with thinking, impaired memory, impaired concentration.  Denies issues with jaundice or new red skin rashes.  He denies issues with changes in bowel movements or urinary habits.  He denies issues with fluid retention in his lower  "extremities or his abdomen.    Past Medical History:  Past Medical History:   Diagnosis Date     ADD (attention deficit disorder)      Generalized anxiety disorder      Polysubstance abuse (H)     Alcohol, Methamphetamines     Psychosis, unspecified psychosis type (H)    - Chronic hepatitis C    Surgical History:  Past Surgical History:   Procedure Laterality Date     LAPAROSCOPIC PYLOROMYOTOMY CHILD         Social History:  Social History     Tobacco Use     Smoking status: Former Smoker     Smokeless tobacco: Never Used     Tobacco comment: Uses eJuice    Substance Use Topics     Alcohol use: Not Currently     Comment: Hx of Alcohol Abuse per EMR. Denies recent use     Drug use: Yes     Types: Marijuana       Family History:  Family History   Family history unknown: Yes   - Denies a history of liver disease, cirrhosis, or bleeding/clotting problems    Medications:  Current Outpatient Medications   Medication     ARIPiprazole (ABILIFY) 5 MG tablet     gabapentin (NEURONTIN) 100 MG capsule     glecaprevir-pibrentasvir (MAVYRET) 100-40 MG per tablet     ARIPiprazole (ABILIFY) 10 MG tablet     No current facility-administered medications for this visit.        Review of Systems  A complete 10 point review of systems was asked and answered in the negative unless specifically commented upon in the HPI    Objective:           Vitals:    05/09/19 0840   BP: 137/85   Pulse: 78   SpO2: 95%   Weight: 87 kg (191 lb 12.8 oz)   Height: 1.803 m (5' 11\")     Body mass index is 26.75 kg/m .     Physical Exam    Vitals reviewed.   Constitutional: Well-developed, well-nourished, in no apparent distress.    HEENT: Normocephalic. no scleral icterus. Moist oral mucosa. Dentition normal  Neck/Lymph: Normal ROM, supple. No thyromegaly.  No lymphadenopathy  Cardiac:  Regular rate and rhythm.  No overt murmurs  Respiratory: Clear to auscultation bilaterally.  No wheezes or rales  GI:  Abdomen soft, non-distended, non-tender. BS present. no " shifting dullness. No overt hepatosplenomegaly.     Skin:  Skin is warm and dry. No rash noted.  no jaundice. no spider nevi noted.  no palmar erythema  Peripheral Vascular: no lower extremity edema. 2+ pulses in all extremities  Musculoskeletal:  ROM intact, normal muscle bulk    Psychiatric: Normal mood and affect. Behavior is normal.  Neuro:  no asterixis, no tremor    Labs and Diagnostic tests:  Lab Results   Component Value Date     04/07/2019    POTASSIUM 4.7 04/07/2019    CHLORIDE 104 04/07/2019    CO2 30 04/07/2019    BUN 16 04/07/2019    CR 0.84 04/07/2019     Lab Results   Component Value Date    BILITOTAL 0.5 04/07/2019     04/07/2019    AST 50 04/07/2019    ALKPHOS 58 04/07/2019     Lab Results   Component Value Date    ALBUMIN 3.9 04/07/2019    PROTTOTAL 7.6 04/07/2019      Lab Results   Component Value Date    WBC 7.8 04/07/2019    HGB 16.6 04/07/2019    MCV 98 04/07/2019     04/07/2019     Results for RAPHAEL HONG (MRN [id number]) as of 5/9/2019 10:57   Ref. Range 4/7/2019 10:56   Hep B Surface Agn Latest Ref Range: NR^Nonreactive  Nonreactive   Hepatitis B Surface Antibody Latest Ref Range: <8.00 m[IU]/mL 7.35   Hepatitis C Antibody Latest Ref Range: NR^Nonreactive  Reactive (AA)   HIV Antigen Antibody Combo Latest Ref Range: NR^Nonreactive     Nonreactive   HCV RNA Quant IU/ml Latest Ref Range: HCVND^HCV RNA Not Detected [IU]/mL 33,953 (A)   Log of HCV RNA Qt Latest Ref Range: <1.2 Log IU/mL 4.5 (H)     Fibrosis Scan 5/9/2019:  -Liver disease: Hepatitis C   - mean elasticity pressure: 4.6 kPa  - F0-F1 fibrosis      Assessment and Plan:    Chronic Hepatitis C:    -Based on available labs patient does have chronic hepatitis C  -His exposures include history of IV or inhaled drugs of abuse, as well as high risk sexual encounters  -Based on available data, this patient is an excellent candidate for treatment of his chronic hepatitis C.  There is no doubt will decrease his risk  of developing hepatic fibrosis, hepatocellular carcinoma, development of cirrhosis, and also decrease his risk long-term for needing liver transplantation  -We will order hepatitis C genotype and viral load on today's clinic visit  -Noted that he is immune to chronic hepatitis B, and will obtain core antibody  -He has not used drugs of abuse in greater than 1 month and is an active recovery program do not believe that this will impede his ability to remain tolerating complete the medications as prescribed  -He is not actively drinking any alcohol, nor was this ever a significant problem for him  -We have ordered Mavyret for 8 weeks and sent prescription to the [place] specialty mailing pharmacy    Chronic Hepatitis C Education:  - Patient was educated as to mode of spread of virus  - Encouraged to avoid sharing personal items such as: toothbrushes, nail clippers, razors.  If shared, they should all be cleaned thoroughly.  - Recommend all sexual partners be screened for hepatitis c      Follow Up:  Repeat HCV RNA 3 months after completion of treatment     Thank you very much for the opportunity to participate in the care of this patient.  If you have any further questions, please don't hesitate to contact our office.    Thomas M. Leventhal, M.D.   of Medicine  Advanced & Transplant Hepatology  [facility]    I spent a total time (reviewing notes, labs, imaging, clinical status events) of 45 minutes with the patient, of which > 50% was spent face-to-face with the patient in education, care coordination and counseling regarding chronic hepatitis C (explaining treatment options, disease processes, laboratory/imaging results, prognosis, risks and benefits of treatment options, medication side effects, importance of compliance with treatment, risk factor reduction, follow up with primary care physician).        Again, thank you for allowing me to participate in the care  of your patient.      Sincerely,    Thomas M. Leventhal, MD

## 2019-05-09 NOTE — PATIENT INSTRUCTIONS
- Labs today    - Fibrosis Scan today    - You will be contacted about the hepatitis C medications.    - We will check your hepatitis C level in 6 months

## 2019-05-09 NOTE — LETTER
5/9/2019      RE: Tani Ramirez  [address]  [place] [address]       Date of Service: 5/9/2019     Subjective:            Tani Ramirez is a 28 year old male presenting for evaluation of liver disease    History of Present Illness   Tani Ramirez is a 28 year old male with past medical history of polysubstance abuse (including IV and inhaled methamphetamine, cocaine, heroin) with last use greater than 1 month ago, history of generalized anxiety disorder, mood disorder, and paranoia.  He was recently admitted in early April with concerns of acute psychosis felt secondary to substance induced mood disorder which ultimately required a prolonged inpatient psychiatric stay.  During that admission he had labs checked secondary to an elevated ALT and AST, and was found to have hepatitis C with a viral load of approximately 34,000.    He reports that he has used alcohol and drugs since approximately the age of 14.  Notes he does have a history of both IV and inhaled drugs of abuse, including cocaine, heroin, and most significantly methamphetamine.  He does note a history of drinking intermittently of the ages [age], but does note that he got a DUI in 2013 and has had very rare alcohol since.  He notes that his last alcohol was approximately 1 year ago.  He does admit to sharing needles in the past, and does admit to sharing devices for snorting or inhaling drugs in the past as well.  He does admit to having high risk sexual partner who he believes had hepatitis.    He denies issues with thinking, impaired memory, impaired concentration.  Denies issues with jaundice or new red skin rashes.  He denies issues with changes in bowel movements or urinary habits.  He denies issues with fluid retention in his lower extremities or his abdomen.    Past Medical History:  Past Medical History:   Diagnosis Date     ADD (attention deficit disorder)      Generalized anxiety disorder      Polysubstance abuse (H)      "Alcohol, Methamphetamines     Psychosis, unspecified psychosis type (H)    - Chronic hepatitis C    Surgical History:  Past Surgical History:   Procedure Laterality Date     LAPAROSCOPIC PYLOROMYOTOMY CHILD         Social History:  Social History     Tobacco Use     Smoking status: Former Smoker     Smokeless tobacco: Never Used     Tobacco comment: Uses Occipitalce    Substance Use Topics     Alcohol use: Not Currently     Comment: Hx of Alcohol Abuse per EMR. Denies recent use     Drug use: Yes     Types: Marijuana       Family History:  Family History   Family history unknown: Yes   - Denies a history of liver disease, cirrhosis, or bleeding/clotting problems    Medications:  Current Outpatient Medications   Medication     ARIPiprazole (ABILIFY) 5 MG tablet     gabapentin (NEURONTIN) 100 MG capsule     glecaprevir-pibrentasvir (MAVYRET) 100-40 MG per tablet     ARIPiprazole (ABILIFY) 10 MG tablet     No current facility-administered medications for this visit.        Review of Systems  A complete 10 point review of systems was asked and answered in the negative unless specifically commented upon in the HPI    Objective:           Vitals:    05/09/19 0840   BP: 137/85   Pulse: 78   SpO2: 95%   Weight: 87 kg (191 lb 12.8 oz)   Height: 1.803 m (5' 11\")     Body mass index is 26.75 kg/m .     Physical Exam    Vitals reviewed.   Constitutional: Well-developed, well-nourished, in no apparent distress.    HEENT: Normocephalic. no scleral icterus. Moist oral mucosa. Dentition normal  Neck/Lymph: Normal ROM, supple. No thyromegaly.  No lymphadenopathy  Cardiac:  Regular rate and rhythm.  No overt murmurs  Respiratory: Clear to auscultation bilaterally.  No wheezes or rales  GI:  Abdomen soft, non-distended, non-tender. BS present. no shifting dullness. No overt hepatosplenomegaly.     Skin:  Skin is warm and dry. No rash noted.  no jaundice. no spider nevi noted.  no palmar erythema  Peripheral Vascular: no lower extremity " edema. 2+ pulses in all extremities  Musculoskeletal:  ROM intact, normal muscle bulk    Psychiatric: Normal mood and affect. Behavior is normal.  Neuro:  no asterixis, no tremor    Labs and Diagnostic tests:  Lab Results   Component Value Date     04/07/2019    POTASSIUM 4.7 04/07/2019    CHLORIDE 104 04/07/2019    CO2 30 04/07/2019    BUN 16 04/07/2019    CR 0.84 04/07/2019     Lab Results   Component Value Date    BILITOTAL 0.5 04/07/2019     04/07/2019    AST 50 04/07/2019    ALKPHOS 58 04/07/2019     Lab Results   Component Value Date    ALBUMIN 3.9 04/07/2019    PROTTOTAL 7.6 04/07/2019      Lab Results   Component Value Date    WBC 7.8 04/07/2019    HGB 16.6 04/07/2019    MCV 98 04/07/2019     04/07/2019     Results for RAPHAEL HONG (MRN [id number]) as of 5/9/2019 10:57   Ref. Range 4/7/2019 10:56   Hep B Surface Agn Latest Ref Range: NR^Nonreactive  Nonreactive   Hepatitis B Surface Antibody Latest Ref Range: <8.00 m[IU]/mL 7.35   Hepatitis C Antibody Latest Ref Range: NR^Nonreactive  Reactive (AA)   HIV Antigen Antibody Combo Latest Ref Range: NR^Nonreactive     Nonreactive   HCV RNA Quant IU/ml Latest Ref Range: HCVND^HCV RNA Not Detected [IU]/mL 33,953 (A)   Log of HCV RNA Qt Latest Ref Range: <1.2 Log IU/mL 4.5 (H)     Fibrosis Scan 5/9/2019:  -Liver disease: Hepatitis C   - mean elasticity pressure: 4.6 kPa  - F0-F1 fibrosis      Assessment and Plan:    Chronic Hepatitis C:    -Based on available labs patient does have chronic hepatitis C  -His exposures include history of IV or inhaled drugs of abuse, as well as high risk sexual encounters  -Based on available data, this patient is an excellent candidate for treatment of his chronic hepatitis C.  There is no doubt will decrease his risk of developing hepatic fibrosis, hepatocellular carcinoma, development of cirrhosis, and also decrease his risk long-term for needing liver transplantation  -We will order hepatitis C genotype  and viral load on today's clinic visit  -Noted that he is immune to chronic hepatitis B, and will obtain core antibody  -He has not used drugs of abuse in greater than 1 month and is an active recovery program do not believe that this will impede his ability to remain tolerating complete the medications as prescribed  -He is not actively drinking any alcohol, nor was this ever a significant problem for him  -We have ordered Mavyret for 8 weeks and sent prescription to the [place] specialty mailing pharmacy    Chronic Hepatitis C Education:  - Patient was educated as to mode of spread of virus  - Encouraged to avoid sharing personal items such as: toothbrushes, nail clippers, razors.  If shared, they should all be cleaned thoroughly.  - Recommend all sexual partners be screened for hepatitis c      Follow Up:  Repeat HCV RNA 3 months after completion of treatment     Thank you very much for the opportunity to participate in the care of this patient.  If you have any further questions, please don't hesitate to contact our office.    Thomas M. Leventhal, M.D.   of Medicine  Advanced & Transplant Hepatology  [facility]    I spent a total time (reviewing notes, labs, imaging, clinical status events) of 45 minutes with the patient, of which > 50% was spent face-to-face with the patient in education, care coordination and counseling regarding chronic hepatitis C (explaining treatment options, disease processes, laboratory/imaging results, prognosis, risks and benefits of treatment options, medication side effects, importance of compliance with treatment, risk factor reduction, follow up with primary care physician).        Thomas M. Leventhal, MD

## 2019-05-09 NOTE — PROGRESS NOTES
Date of Service: 5/9/2019     Subjective:            Tani Ramirez is a 28 year old male presenting for evaluation of liver disease    History of Present Illness   Tani Ramirez is a 28 year old male with past medical history of polysubstance abuse (including IV and inhaled methamphetamine, cocaine, heroin) with last use greater than 1 month ago, history of generalized anxiety disorder, mood disorder, and paranoia.  He was recently admitted in early April with concerns of acute psychosis felt secondary to substance induced mood disorder which ultimately required a prolonged inpatient psychiatric stay.  During that admission he had labs checked secondary to an elevated ALT and AST, and was found to have hepatitis C with a viral load of approximately 34,000.    He reports that he has used alcohol and drugs since approximately the age of 14.  Notes he does have a history of both IV and inhaled drugs of abuse, including cocaine, heroin, and most significantly methamphetamine.  He does note a history of drinking intermittently of the ages [age], but does note that he got a DUI in 2013 and has had very rare alcohol since.  He notes that his last alcohol was approximately 1 year ago.  He does admit to sharing needles in the past, and does admit to sharing devices for snorting or inhaling drugs in the past as well.  He does admit to having high risk sexual partner who he believes had hepatitis.    He denies issues with thinking, impaired memory, impaired concentration.  Denies issues with jaundice or new red skin rashes.  He denies issues with changes in bowel movements or urinary habits.  He denies issues with fluid retention in his lower extremities or his abdomen.    Past Medical History:  Past Medical History:   Diagnosis Date     ADD (attention deficit disorder)      Generalized anxiety disorder      Polysubstance abuse (H)     Alcohol, Methamphetamines     Psychosis, unspecified psychosis type (H)    - Chronic  "hepatitis C    Surgical History:  Past Surgical History:   Procedure Laterality Date     LAPAROSCOPIC PYLOROMYOTOMY CHILD         Social History:  Social History     Tobacco Use     Smoking status: Former Smoker     Smokeless tobacco: Never Used     Tobacco comment: Uses eJuice    Substance Use Topics     Alcohol use: Not Currently     Comment: Hx of Alcohol Abuse per EMR. Denies recent use     Drug use: Yes     Types: Marijuana       Family History:  Family History   Family history unknown: Yes   - Denies a history of liver disease, cirrhosis, or bleeding/clotting problems    Medications:  Current Outpatient Medications   Medication     ARIPiprazole (ABILIFY) 5 MG tablet     gabapentin (NEURONTIN) 100 MG capsule     glecaprevir-pibrentasvir (MAVYRET) 100-40 MG per tablet     ARIPiprazole (ABILIFY) 10 MG tablet     No current facility-administered medications for this visit.        Review of Systems  A complete 10 point review of systems was asked and answered in the negative unless specifically commented upon in the HPI    Objective:           Vitals:    05/09/19 0840   BP: 137/85   Pulse: 78   SpO2: 95%   Weight: 87 kg (191 lb 12.8 oz)   Height: 1.803 m (5' 11\")     Body mass index is 26.75 kg/m .     Physical Exam    Vitals reviewed.   Constitutional: Well-developed, well-nourished, in no apparent distress.    HEENT: Normocephalic. no scleral icterus. Moist oral mucosa. Dentition normal  Neck/Lymph: Normal ROM, supple. No thyromegaly.  No lymphadenopathy  Cardiac:  Regular rate and rhythm.  No overt murmurs  Respiratory: Clear to auscultation bilaterally.  No wheezes or rales  GI:  Abdomen soft, non-distended, non-tender. BS present. no shifting dullness. No overt hepatosplenomegaly.     Skin:  Skin is warm and dry. No rash noted.  no jaundice. no spider nevi noted.  no palmar erythema  Peripheral Vascular: no lower extremity edema. 2+ pulses in all extremities  Musculoskeletal:  ROM intact, normal muscle bulk  "   Psychiatric: Normal mood and affect. Behavior is normal.  Neuro:  no asterixis, no tremor    Labs and Diagnostic tests:  Lab Results   Component Value Date     04/07/2019    POTASSIUM 4.7 04/07/2019    CHLORIDE 104 04/07/2019    CO2 30 04/07/2019    BUN 16 04/07/2019    CR 0.84 04/07/2019     Lab Results   Component Value Date    BILITOTAL 0.5 04/07/2019     04/07/2019    AST 50 04/07/2019    ALKPHOS 58 04/07/2019     Lab Results   Component Value Date    ALBUMIN 3.9 04/07/2019    PROTTOTAL 7.6 04/07/2019      Lab Results   Component Value Date    WBC 7.8 04/07/2019    HGB 16.6 04/07/2019    MCV 98 04/07/2019     04/07/2019     Results for RAPHAEL HONG (MRN [id number]) as of 5/9/2019 10:57   Ref. Range 4/7/2019 10:56   Hep B Surface Agn Latest Ref Range: NR^Nonreactive  Nonreactive   Hepatitis B Surface Antibody Latest Ref Range: <8.00 m[IU]/mL 7.35   Hepatitis C Antibody Latest Ref Range: NR^Nonreactive  Reactive (AA)   HIV Antigen Antibody Combo Latest Ref Range: NR^Nonreactive     Nonreactive   HCV RNA Quant IU/ml Latest Ref Range: HCVND^HCV RNA Not Detected [IU]/mL 33,953 (A)   Log of HCV RNA Qt Latest Ref Range: <1.2 Log IU/mL 4.5 (H)     Fibrosis Scan 5/9/2019:  -Liver disease: Hepatitis C   - mean elasticity pressure: 4.6 kPa  - F0-F1 fibrosis      Assessment and Plan:    Chronic Hepatitis C:    -Based on available labs patient does have chronic hepatitis C  -His exposures include history of IV or inhaled drugs of abuse, as well as high risk sexual encounters  -Based on available data, this patient is an excellent candidate for treatment of his chronic hepatitis C.  There is no doubt will decrease his risk of developing hepatic fibrosis, hepatocellular carcinoma, development of cirrhosis, and also decrease his risk long-term for needing liver transplantation  -We will order hepatitis C genotype and viral load on today's clinic visit  -Noted that he is immune to chronic hepatitis B,  and will obtain core antibody  -He has not used drugs of abuse in greater than 1 month and is an active recovery program do not believe that this will impede his ability to remain tolerating complete the medications as prescribed  -He is not actively drinking any alcohol, nor was this ever a significant problem for him  -We have ordered Mavyret for 8 weeks and sent prescription to the [facility]    Chronic Hepatitis C Education:  - Patient was educated as to mode of spread of virus  - Encouraged to avoid sharing personal items such as: toothbrushes, nail clippers, razors.  If shared, they should all be cleaned thoroughly.  - Recommend all sexual partners be screened for hepatitis c      Follow Up:  Repeat HCV RNA 3 months after completion of treatment     Thank you very much for the opportunity to participate in the care of this patient.  If you have any further questions, please don't hesitate to contact our office.    Thomas M. Leventhal, M.D.   of Medicine  Advanced & Transplant Hepatology  [facility]    I spent a total time (reviewing notes, labs, imaging, clinical status events) of 45 minutes with the patient, of which > 50% was spent face-to-face with the patient in education, care coordination and counseling regarding chronic hepatitis C (explaining treatment options, disease processes, laboratory/imaging results, prognosis, risks and benefits of treatment options, medication side effects, importance of compliance with treatment, risk factor reduction, follow up with primary care physician).

## 2019-05-13 LAB
HCV AB SERPL QL IA: REACTIVE
HCV RNA SERPL NAA+PROBE-ACNC: ABNORMAL [IU]/ML
HCV RNA SERPL NAA+PROBE-LOG IU: 4.7 LOG IU/ML

## 2019-05-17 LAB
HCV GENTYP SERPL NAA+PROBE: NORMAL
HCV RNA SERPL NAA+PROBE-ACNC: ABNORMAL [IU]/ML
HCV RNA SERPL NAA+PROBE-LOG IU: 4.7 LOG IU/ML

## 2022-04-12 ENCOUNTER — TELEPHONE (OUTPATIENT)
Dept: BEHAVIORAL HEALTH | Facility: CLINIC | Age: 32
End: 2022-04-12

## 2022-04-12 ENCOUNTER — TRANSFERRED RECORDS (OUTPATIENT)
Dept: HEALTH INFORMATION MANAGEMENT | Facility: CLINIC | Age: 32
End: 2022-04-12

## 2022-04-12 LAB
ALT SERPL-CCNC: 33 U/L (ref 8–45)
AST SERPL-CCNC: 21 U/L (ref 5–41)
CREATININE (EXTERNAL): 0.77 MG/DL (ref 0.72–1.25)
GFR ESTIMATED (EXTERNAL): >60 ML/MIN/1.73M2
GLUCOSE (EXTERNAL): 83 MG/DL (ref 70–100)
POTASSIUM (EXTERNAL): 3.7 MMOL/L (ref 3.5–5.1)
TSH SERPL-ACNC: 1.38 UIU/ML (ref 0.47–5)

## 2022-04-13 ENCOUNTER — HOSPITAL ENCOUNTER (INPATIENT)
Facility: HOSPITAL | Age: 32
LOS: 9 days | Discharge: HOME | DRG: 885 | End: 2022-04-22
Attending: STUDENT IN AN ORGANIZED HEALTH CARE EDUCATION/TRAINING PROGRAM | Admitting: STUDENT IN AN ORGANIZED HEALTH CARE EDUCATION/TRAINING PROGRAM
Payer: COMMERCIAL

## 2022-04-13 DIAGNOSIS — G47.9 SLEEP DISTURBANCE: ICD-10-CM

## 2022-04-13 DIAGNOSIS — F29 PSYCHOSIS, UNSPECIFIED PSYCHOSIS TYPE (H): ICD-10-CM

## 2022-04-13 DIAGNOSIS — F41.9 ANXIETY: Primary | ICD-10-CM

## 2022-04-13 PROBLEM — B18.2 CHRONIC HEPATITIS C VIRUS INFECTION (H): Status: ACTIVE | Noted: 2019-05-09

## 2022-04-13 PROBLEM — R79.89 ELEVATED LFTS: Status: ACTIVE | Noted: 2019-07-01

## 2022-04-13 PROBLEM — E78.5 DYSLIPIDEMIA: Status: ACTIVE | Noted: 2019-07-02

## 2022-04-13 PROBLEM — E66.09 OBESITY DUE TO EXCESS CALORIES, UNSPECIFIED OBESITY SEVERITY: Status: ACTIVE | Noted: 2019-07-01

## 2022-04-13 PROCEDURE — 99223 1ST HOSP IP/OBS HIGH 75: CPT | Performed by: NURSE PRACTITIONER

## 2022-04-13 PROCEDURE — 250N000013 HC RX MED GY IP 250 OP 250 PS 637: Performed by: NURSE PRACTITIONER

## 2022-04-13 PROCEDURE — 99221 1ST HOSP IP/OBS SF/LOW 40: CPT | Performed by: NURSE PRACTITIONER

## 2022-04-13 PROCEDURE — 124N000001 HC R&B MH

## 2022-04-13 RX ORDER — CHLORAL HYDRATE 500 MG
1 CAPSULE ORAL DAILY
COMMUNITY

## 2022-04-13 RX ORDER — ACETAMINOPHEN 325 MG/1
650 TABLET ORAL EVERY 4 HOURS PRN
Status: DISCONTINUED | OUTPATIENT
Start: 2022-04-13 | End: 2022-04-22 | Stop reason: HOSPADM

## 2022-04-13 RX ORDER — OLANZAPINE 10 MG/2ML
10 INJECTION, POWDER, FOR SOLUTION INTRAMUSCULAR 3 TIMES DAILY PRN
Status: DISCONTINUED | OUTPATIENT
Start: 2022-04-13 | End: 2022-04-18

## 2022-04-13 RX ORDER — HYDROXYZINE HYDROCHLORIDE 25 MG/1
50 TABLET, FILM COATED ORAL EVERY 4 HOURS PRN
Status: DISCONTINUED | OUTPATIENT
Start: 2022-04-13 | End: 2022-04-21

## 2022-04-13 RX ORDER — OLANZAPINE 10 MG/1
10 TABLET ORAL 3 TIMES DAILY PRN
Status: DISCONTINUED | OUTPATIENT
Start: 2022-04-13 | End: 2022-04-18

## 2022-04-13 RX ORDER — LANOLIN ALCOHOL/MO/W.PET/CERES
3 CREAM (GRAM) TOPICAL
Status: DISCONTINUED | OUTPATIENT
Start: 2022-04-13 | End: 2022-04-22 | Stop reason: HOSPADM

## 2022-04-13 RX ORDER — POLYETHYLENE GLYCOL 3350 17 G/17G
17 POWDER, FOR SOLUTION ORAL DAILY PRN
Status: DISCONTINUED | OUTPATIENT
Start: 2022-04-13 | End: 2022-04-22 | Stop reason: HOSPADM

## 2022-04-13 RX ORDER — MAGNESIUM HYDROXIDE/ALUMINUM HYDROXICE/SIMETHICONE 120; 1200; 1200 MG/30ML; MG/30ML; MG/30ML
30 SUSPENSION ORAL EVERY 4 HOURS PRN
Status: DISCONTINUED | OUTPATIENT
Start: 2022-04-13 | End: 2022-04-22 | Stop reason: HOSPADM

## 2022-04-13 RX ADMIN — OLANZAPINE 10 MG: 10 TABLET, FILM COATED ORAL at 17:57

## 2022-04-13 ASSESSMENT — ACTIVITIES OF DAILY LIVING (ADL)
DOING_ERRANDS_INDEPENDENTLY_DIFFICULTY: NO
TOILETING_ISSUES: NO
CONCENTRATING,_REMEMBERING_OR_MAKING_DECISIONS_DIFFICULTY: NO
WEAR_GLASSES_OR_BLIND: NO
CHANGE_IN_FUNCTIONAL_STATUS_SINCE_ONSET_OF_CURRENT_ILLNESS/INJURY: NO
LAUNDRY: UNABLE TO COMPLETE
HYGIENE/GROOMING: INDEPENDENT
DIFFICULTY_EATING/SWALLOWING: NO
DRESS: INDEPENDENT
ORAL_HYGIENE: INDEPENDENT
DRESSING/BATHING_DIFFICULTY: NO
WALKING_OR_CLIMBING_STAIRS_DIFFICULTY: NO
FALL_HISTORY_WITHIN_LAST_SIX_MONTHS: NO

## 2022-04-13 NOTE — PROGRESS NOTES
04/13/22 1232   Patient Belongings   Did you bring any home meds/supplements to the hospital?  No   Patient Belongings locker;sent to security per site process   Patient Belongings Put in Hospital Secure Location (Security or Locker, etc.) cash/credit card;cell phone/electronics;clothing;keys;ring;shoes;wallet   Belongings Search Yes   Clothing Search Yes   Second Staff Augusto   Comment peralta sweatpants, blue underwear, blue long sleeve shirt, shorty shoes, black northface jacket, lighter, empty medication bottle, 4 reciepts, folder of papers   List items sent to safe: black colored ring, car key om an american flag lanyard with a geek vape key, samsung phone in a black case with no cracks, marvel wallet, visa gift card, [employer] rewards card, [employer] rewards card, [employer] visa card, 5 business cards, covid-19 vaccination card, 2 amazon gift cards, [place] Drivers license, car title # [id number], receipt for tabs paid.     All other belongings put in assigned cubby in belongings room.       I have reviewed my belongings list on admission and verify that it is correct.     Patient signature_______________________________    Second staff witness (if patient unable to sign) ______________________________       I have received all my belongings at discharge.    Patient signature________________________________    Meg  4/13/2022  12:36 PM

## 2022-04-13 NOTE — PLAN OF CARE
"Social Service Psychosocial Assessment  Attempted Assessment with CNP. Pt was too tired to answer questions. Will come back tomorrow to finish assessment.  Presenting Problem: According to records pt was found at a gas station where he had been trying to figure out his debit card and the KISHA for 1/2 an hour. Showing signs of delusion and confusion. Brought here for evaluation. Per assessment pt states he was seeing things \"I thought I saw people I knew from a long time ago\".   Marital Status: Single    Spouse / Children: Pt states he was paying child support but went to court to give up his rights, though is still paying child support.   Psychiatric TX HX: This is the pt's first time on our unit. He was admitted inpatient in 2019 for psychosis. This hospital stay resulted in the pt getting a stay of commitment which he went door to door to treatment.   Suicide Risk Assessment: Pt denies SI on admit. Pt has hx of SA by hanging himself with workout equipment in his parents basement in 2012. Dad found and revived him according to records. Pt denies SI \"right now\".    Access to Lethal Means (explain): Denies     Family Psych HX: Unknown    A & Ox: x3    Medication Adherence: See H&P     Medical Issues: See H&P    Visual -Motor Functioning: OK    Communication Skills /Needs: OK    Ethnicity: White   Spirituality/Orthodox Affiliation: Unknown  Clergy Request: No   History: None reported     Living Situation: Pt was previously living with an ex girlfriend. Pt is currently homeless.   ADL s: Independent    Education: Uknown   Financial Situation: Unknown     Occupation: Unemplyed    Leisure & Recreation: Unknown  Childhood History: Unknown     Trauma Abuse HX: Unknown    Relationship / Sexuality: Unknown    Substance Use/ Abuse: No utox available. Pt reports meth and Adderall use. Last used Meth around 4-5 days ago. Pt stating he wants to be clean form drugs.   Chemical Dependency Treatment HX: [facility]     Legal Issues: Denies other than child support issues.     Significant Life Events: Unknown     Strengths: In a safe environment, ability to communicate needs.     Challenges /Limitation: Chemical dependency, poor coping skills, lack of community services.     Patient Support Contact (Include name, relationship, number, and summary of conversation): Pt currently has no MAHI's signed at this time.     Interventions:        Community-Based Programs-    Medical/Dental Care- None    CD Evaluation/Rule 25/Aftercare- Would benefit     Medication Management- Would benefit    Individual Therapy- Would benefit     Housing/Placement- Homeless     Insurance Coverage- [employer]     Commit/Raphael Screening- Was on a stay of commitment in 2019.     Suicide Risk Assessment- Pt denies SI on admit. Pt has hx of SA by hanging himself with workout equipment in his parents basement in 2012. Dad found and revived him according to records. Pt denies SI \"right now\".      High Risk Safety Plan- Talk to supports; Call crisis lines; Go to local ER if feeling suicidal.  ANSELMO FERNÁNDEZ  4/13/2022  12:49 PM      "

## 2022-04-13 NOTE — H&P
[facility]    History and Physical  Medical Services       Date of Admission:  4/13/2022  Date of Service (when I saw the patient): 04/13/22    Assessment & Plan     Principal Problem:    Psychosis, unspecified psychosis type (H)- head CT on 4/9 shows no evidence of acute intracranial hemorrhage or mass effect.     Active Medical Problems:    Chronic hepatitis C virus infection (H)- ALT and AST wnl. CMP unremarkable,  Pt is a poor historian. Unsure if this was treated.     Pt medically stable, no acute medical concerns. Chronic medical problems stable. Will sign off. Please consult for any new medical issues or concerns.         Code Status: Full Code    Malu Zuluaga CNP    Primary Care Physician   Physician No Ref-Primary    Chief Complaint   Psych evaluation     History is obtained from the medical chart     History of Present Illness   (per ED) Tani Ramirez is a 31-year-old male with a past history of polysubstance abuse including alcohol, stimulants and cannabis as well as unspecified mood disorder, psychosis and a provisional bipolar 1 disorder who was brought to the [facility] for disorganized behavior and confusion. Tani was at a gas station for approximately a half an hour before police were called having difficulties with his debit card and how the KISHA worked. He was described as being in bare feet and may not have had a shirt on. He was calm and cooperative, however, due to cognitive concerns and confusion he was brought to the Emergency Room for further evaluation. On arrival, medical workup was overall unremarkable. Alcohol level was 0. Urine drug screen is positive for cannabinoids. He has multiple medical complaints, but essentially medically cleared. Psychiatry was consulted for further evaluation.         Past Medical History    I have reviewed this patient's medical history and updated it with pertinent information if needed.   Past Medical History:   Diagnosis Date      ADD (attention deficit disorder)      Generalized anxiety disorder      Polysubstance abuse (H)     Alcohol, Methamphetamines     Psychosis, unspecified psychosis type (H)        Past Surgical History   I have reviewed this patient's surgical history and updated it with pertinent information if needed.  Past Surgical History:   Procedure Laterality Date     LAPAROSCOPIC PYLOROMYOTOMY CHILD         Prior to Admission Medications   Prior to Admission Medications   Prescriptions Last Dose Informant Patient Reported? Taking?   Cholecalciferol (VITAMIN D3 PO) Past Week at Unknown time  Yes Yes   Sig: Take 1 tablet by mouth daily   fish oil-omega-3 fatty acids 1000 MG capsule Past Week at Unknown time  Yes Yes   Sig: Take 1 g by mouth daily      Facility-Administered Medications: None     Allergies   No Known Allergies    Social History   I have reviewed this patient's social history and updated it with pertinent information if needed. Tani Ramirez  reports that he has quit smoking. He has never used smokeless tobacco. He reports previous alcohol use. He reports current drug use. Drug: Marijuana.    Family History   I have reviewed this patient's family history and updated it with pertinent information if needed.   Family History   Family history unknown: Yes       Review of Systems   Unable to assess due to mental status    Physical Exam   Temp: 97.6  F (36.4  C) Temp src: Tympanic BP: 135/86 Pulse: 74   Resp: 16 SpO2: 96 % O2 Device: None (Room air)    Vital Signs with Ranges  Temp:  [97.6  F (36.4  C)] 97.6  F (36.4  C)  Pulse:  [74] 74  Resp:  [16] 16  BP: (135)/(86) 135/86  SpO2:  [96 %] 96 %  163 lbs 6.4 oz    Constitutional: awake, alert, cooperative, no apparent distress, and appears stated age, vitals stable   Eyes: Lids and lashes normal, pupils equal, round and reactive to light, extra ocular muscles intact, sclera clear, conjunctiva normal  ENT: Normocephalic, without obvious abnormality, atraumatic,  external ears without lesions, oral pharynx with moist mucous membranes, no erythema or exudates  Hematologic / Lymphatic: no cervical lymphadenopathy  Respiratory: No increased work of breathing, good air exchange, clear to auscultation bilaterally, no crackles or wheezing  Cardiovascular: Normal apical impulse, regular rate and rhythm, normal S1 and S2, no S3 or S4, and no murmur noted  GI: normal bowel sounds, soft, non-distended, non-tender, no masses palpated, no hepatosplenomegally  Genitounirinary: deferred  Skin: normal skin color, texture, turgor, no redness, warmth, or swelling and no rashes  Musculoskeletal: There is no redness, warmth, or swelling of the joints.  Full range of motion noted.   Neurologic: Awake, alert, oriented to name  Neuropsychiatric: General: confused disorganized, normal eye contact    Data   Data reviewed today:   No lab results found in last 7 days.    No results found for this or any previous visit (from the past 24 hour(s)).
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
## 2022-04-13 NOTE — DISCHARGE INSTRUCTIONS
Behavioral Discharge Planning and Instructions    Summary: You were admitted on 4/13/2022  due to Psychotic Symptomology.  You were treated by the treatment team and discharged on 4/22/2022 from [address] [place], [place].    Main Diagnosis: Unspecified psychosis  R/o substance induced psychosis  R/o bipolar 1 disorder (by history)  Stimulant (methamphetamine) use disorder, likely moderate to severe  Chronic hep C+    Health Care Follow-up:     *pt leaving before coordinating follow-up appointments due to changing discharge plans/location. Local  resources will be listed by discharge address*      [facility]  Mental Health Crisis Response Services - Adult  [address] [place] [address], [address]  [phone]    [facility]  Mental health services  Address: [address] [place] [address]  Phone: [phone]    [address] [address]  [phone]  [phone]    [facility]  [address], [place], [place], [address] [phone] (phone)  [phone] (fax)    [facility] health service in [place], [place]  Address: [address] [place] [address]  Phone: [phone]    [facility]  [address] [place] [address] [address]  [place] [address]  Phone: [phone]    [facility]  Phone: [phone]    [facility]  Phone: [phone]    Attend all scheduled appointments with your outpatient providers. Call at least 24 hours in advance if you need to reschedule an appointment to ensure continued access to your outpatient providers.     Major Treatments, Procedures and Findings:  You were provided with: a psychiatric assessment, assessed for medical stability, medication evaluation and/or management, group therapy, family therapy, individual therapy, CD  "evaluation/assessment, milieu management, and medical interventions    Symptoms to Report: feeling more aggressive, increased confusion, losing more sleep, mood getting worse, or thoughts of suicide    Early warning signs can include: increased depression or anxiety sleep disturbances increased thoughts or behaviors of suicide or self-harm  increased unusual thinking, such as paranoia or hearing voices    Safety and Wellness:  Take all medicines as directed.  Make no changes unless your doctor suggests them.      Follow treatment recommendations.  Refrain from alcohol and non-prescribed drugs.  Ask your support system to help you reduce your access to items that could harm yourself or others. Items could include:  Firearms  Medicines (both prescribed and over-the-counter)  Knives and other sharp objects  Ropes and like materials  Car keys  If there is a concern for safety, call 911. If there is a concern for safety, call 911.    Resources:   Crisis Intervention: 771.587.6515 or 697-587-7372 (TTY: 932.794.6347).  Call anytime for help.  National [place] on Mental Illness (www.[place].gudelia.org): 420.443.4655 or 618-251-6184.  [place] Association for Children's Mental Health (www.macmh.org): 828.636.3645.  Alcoholics Anonymous (www.alcoholics-anonymous.org): Check your phone book for your local chapter.  Suicide Awareness Voices of Education (SAVE) (www.save.org): 956-002-RMLB (1083)  National Suicide Prevention Line (www.mentalhealthmn.org): 712-349-ARXQ (0406)  Mental Health Consumer/Survivor Network of [place] (www.mhcsn.net): 338.464.7489 or 930-322-3392  Mental Health Association of [place] (www.mentalhealth.org): 534.194.8778 or 118-368-7133  Self- Management and Recovery Training., SMART-- Toll free: 331.339.4776  www.exozet.MailTrack.io  Text 4 Life: txt \"LIFE\" to 28549 for immediate support and crisis intervention  Crisis text line: Text \"[place]\" to 392638. Free, confidential, 24/7.  Crisis Intervention: 329.876.6647 or 594-610-3382. " Call anytime for help.     General Medication Instructions:   See your medication sheet(s) for instructions.   Take all medicines as directed.  Make no changes unless your doctor suggests them.   Go to all your doctor visits.  Be sure to have all your required lab tests. This way, your medicines can be refilled on time.  Do not use any drugs not prescribed by your doctor.  Avoid alcohol.    Advance Directives:   Scanned document on file with [employer]? No scanned doc  Is document scanned? Pt unable to confirm  Honoring Choices Your Rights Handout: Informed and given  Was more information offered? Pt declined    The Treatment team has appreciated the opportunity to work with you. If you have any questions or concerns about your recent admission, you can contact the unit which can receive your call 24 hours a day, 7 days a week. They will be able to get in touch with a Provider if needed. The unit number is [phone] .

## 2022-04-13 NOTE — PLAN OF CARE
"ADMISSION NOTE    Reason for admission: Altered Mental Status.  Safety concerns: Possible elopement risk- Pt states \"Doesn't like to be locked down\"  Risk for or history of violence: Unknown- Pt states \"He doesn't like when people  his way\".   Full skin assessment: Full skin assessment completed, tattoo noted on L tricep and mid-upper back. No bruising or open areas noted    Patient arrived on unit from [facility] Emergency Department accompanied by Security and EMS transportation on 4/13/2022  10:52 AM.   Status on arrival: Calm, cooperative, Confused oriented to place and year, Ambulatory,   /86 (BP Location: Right arm)   Pulse 74   Temp 97.6  F (36.4  C) (Tympanic)   Resp 16   Ht 1.803 m (5' 11\")   Wt 74.1 kg (163 lb 6.4 oz)   SpO2 96%   BMI 22.79 kg/m    Patient given tour of unit and Welcome to  unit papers given to patient, wanding completed, belongings inventoried, and admission assessment completed.   Patient's legal status on arrival is Voluntary Status. Appropriate legal rights discussed with and copy given to patient. Patient Bill of Rights discussed with and copy given to patient.   Patient denies SI, HI, and thoughts of self harm and contracts for safety while on unit.      Pt was calm, cooperative upon arrival. He is very confused and disorganized. Unable to comprehend lunch menu. When asked if he has any allergies he states \"I don't know. My mom and dad think I need acupuncture for that\". He is cooperative with changing into unit scrubs, VS, and wanding. Pt stopped changing midway and put his hands out stating that he felt \"funny\". Pt steady on his feet, but encouraged him to sit down to change into the rest of his scrubs. Pt states that he has always heard \"weird things- not necessarily voices, but things like pen clicking\". He doesn't elaborate on visual hallucination though he does report that he has them. He states that they do get worse with " "methamphetamine use and lack of sleep. He isn't sure when the last time he used or slept. States \"Everything is so scrambled\". He does state that he has been in placement like this before, but doesn't remember when or where. He doesn't recall if he has had any chemical dependency treatment \"I've always used, everything\". He denied any suicidal ideation at this time, but does report one previous attempt \"I drank a whole bunch of booze and then tied a belt around my neck. It wasn't to try to kill myself it was just because I was so depressed and angry\". Pt is unable to tell me which pharmacy he uses \"[facility], but I don't know where\". He was given a tour of the unit. Paperwork for voluntary status and ROIs not signed at this time due to confusion/disorganization. Will attempt again prior to end of shift. Elopement precautions placed due to patient's comments regarding locked facilities.     1445--- Attempted to have patient sign MAHI and voluntary status. He was still very disorganized and confused. Will pass along to next shift for them to attempt.     Dara Purvis, RUBEN  4/13/2022  12:21 PM          "

## 2022-04-13 NOTE — TELEPHONE ENCOUNTER
KAROLINE [facility] faxed referral for 31/M in [facility] 4/12/22 930pm    B Pt BIB EMS with confusion. HX of methamphetamine abuse. Presents with confusion, found outside with bare feet and no shirt. Pt admitted to using meth 3-4 days ago, having trouble thinking, some tangential speech. Pt is cooperative, denies SI or HI.     A Vol    R In [facility] [phone]

## 2022-04-13 NOTE — PLAN OF CARE
"  Problem: Behavioral Health Plan of Care  Goal: Patient-Specific Goal (Individualization)  Description: Pt. Will follow recommendations of treatment team during hospital stay.  Pt. Will sleep 6-8 hours per night during hospital stay.  Pt. Will attend > 50% of unit programing during hospital stay.   Pt. Will maintain ADLs without prompting during hospital stay.   Note: Patient resting in bed in beginning of shift. Up to unit with peers before dinner, walking hallways. Patient appears confused and walking into another patient's room at one point. Flight of ideas and difficulty tracking conversation during assessment attempts. Patient becomes tearful but unable to report why.   Shortly after dinner, patient appears distressed and approaches writer stating, \"can I get something to make this better?\" while waving hands around head. Reports \"hearing people that aren't there talk\" to him.   1757- Received PRN Zyprexa 10 mg.   Patient appears to have some relief within the hour, thanking writer and sitting in group but only for a short period. Laying in bed to rest and appears to be sleeping by 1900 for remainder of shift. Continue to monitor at this time.      Problem: Psychotic Symptoms  Goal: Psychotic Symptoms  Description: Signs and symptoms of listed problems will be absent or manageable.  Note: Continue to monitor at this time.     Face to face end of shift report communicated to oncoming RUBEN.      Fabiola Dumont RN  4/13/2022  4:43 PM       "

## 2022-04-13 NOTE — CARE PLAN
Reviewed medications with patient. Confirmed he is not currently on any maintenance medications.     Reports he used to be on Gabapentin in 2019 but isn't sure why it was discontinued/why he stopped taking it.     Thinks his medications were filled at [facility] in the past but isn't sure.     Isn't sure what pharmacy to choose at this time.     Reports no allergies to medications, said he might be allergic to metals.     Added fish oil and vitamin d 3 pt takes when he remembers. Used to take a multivitamin too but hasn't been taking one recently. Thinks they may have caused ulcers and were too hard on his system.    Denies taking any OTC analgesics.     Review of Care Everywhere and sure scripts completed: unable to find recent history of any maintenance medications.     Yomaira Emanuel on 4/13/2022 at 11:49 AM

## 2022-04-14 PROCEDURE — 250N000013 HC RX MED GY IP 250 OP 250 PS 637: Performed by: NURSE PRACTITIONER

## 2022-04-14 PROCEDURE — 124N000001 HC R&B MH

## 2022-04-14 PROCEDURE — 99232 SBSQ HOSP IP/OBS MODERATE 35: CPT | Performed by: NURSE PRACTITIONER

## 2022-04-14 RX ORDER — ARIPIPRAZOLE 5 MG/1
5 TABLET ORAL DAILY
Status: DISCONTINUED | OUTPATIENT
Start: 2022-04-15 | End: 2022-04-15

## 2022-04-14 RX ORDER — GABAPENTIN 300 MG/1
300 CAPSULE ORAL 3 TIMES DAILY PRN
Status: DISCONTINUED | OUTPATIENT
Start: 2022-04-14 | End: 2022-04-22 | Stop reason: HOSPADM

## 2022-04-14 RX ORDER — NICOTINE 21 MG/24HR
1 PATCH, TRANSDERMAL 24 HOURS TRANSDERMAL DAILY
Status: DISCONTINUED | OUTPATIENT
Start: 2022-04-14 | End: 2022-04-22 | Stop reason: HOSPADM

## 2022-04-14 RX ADMIN — MELATONIN 3 MG: at 20:45

## 2022-04-14 RX ADMIN — NICOTINE POLACRILEX 2 MG: 2 GUM, CHEWING BUCCAL at 17:48

## 2022-04-14 RX ADMIN — OLANZAPINE 10 MG: 10 TABLET, FILM COATED ORAL at 20:45

## 2022-04-14 RX ADMIN — NICOTINE 1 PATCH: 21 PATCH, EXTENDED RELEASE TRANSDERMAL at 12:10

## 2022-04-14 RX ADMIN — NICOTINE POLACRILEX 2 MG: 2 GUM, CHEWING BUCCAL at 07:06

## 2022-04-14 ASSESSMENT — ACTIVITIES OF DAILY LIVING (ADL)
DRESS: SCRUBS (BEHAVIORAL HEALTH);INDEPENDENT
ORAL_HYGIENE: INDEPENDENT
HYGIENE/GROOMING: INDEPENDENT

## 2022-04-14 NOTE — PLAN OF CARE
Attempted to complete a COLTON comprehensive assessment with Pt today. Pt was unable to provide clear answers to questions and had difficulty with recall of information. This writer is unable to formulate a diagnosis and recommendations at this time.

## 2022-04-14 NOTE — PROGRESS NOTES
"[facility]  Psychiatric Progress Note      Impression:    This is a 31 year old yo male with a history of unspecified psychosis, bipolar disorder, polysubstance abuse, and chronic hep C+ who was brought to the ED after he was found to be disorganized and confused at a gas station. Did admit to methamphetamine use a few days ago, utox only positive for THC. Today it is difficult to get much history from him as he is irritable and just wants to sleep. He does voice an interest in CD treatment. Denies taking any medications. During a previous hospital stay in 2019 he was on a stay of commitment and stabilized on Abilify and Gabapentin. Will attempt to discuss these medications with the patient tomorrow after he is able to sleep and may be more agreeable to speaking with me. Will see if he can have a Rule 25 completed while here.          Interim History:   Tani is up walking in the halls when I see him today. He reports feeling \"better today I think\". When asked if he is hearing any voices today he pauses as if to listen and then replies \"not right now\". He does still appear to be paranoid. He states he is worried about whether he needed to be in quarantine because he has a history of hepatitis C. Reviewed that this was not necessary. He then asked if I had ever heard of people being implanted with hearing aids without their knowledge. He does not elaborate further as to if he feels that this was happening to him. He will meet with the [facility] today at some point to complete Rule 25. Reports that he \"thinks\" he slept well last night. Appears he was able to sleep 7 hours last night. We did discuss restarting Abilify, which it appears he has been on in the past for paranoia, which he is agreeable to do.    Educated regarding medication indications, risks, benefits, side effects, contraindications and possible interactions. Verbally expressed understanding.        Diagnoses:   Unspecified psychosis  R/o " "substance induced psychosis  R/o bipolar 1 disorder (by history)  Stimulant (methamphetamine) use disorder, likely moderate to severe  Chronic hep C+      Attestation:  Patient has been seen and evaluated by me,  Shanice Turner NP      The patient's care was discussed with the treatment team and chart notes were reviewed.            Medications:     Current Facility-Administered Medications Ordered in Epic   Medication Dose Route Frequency Last Rate Last Admin     acetaminophen (TYLENOL) tablet 650 mg  650 mg Oral Q4H PRN         alum & mag hydroxide-simethicone (MAALOX) suspension 30 mL  30 mL Oral Q4H PRN         hydrOXYzine (ATARAX) tablet 50 mg  50 mg Oral Q4H PRN         melatonin tablet 3 mg  3 mg Oral At Bedtime PRN         nicotine (NICODERM CQ) 21 MG/24HR 24 hr patch 1 patch  1 patch Transdermal Daily         nicotine (NICORETTE) gum 2 mg  2 mg Buccal Q1H PRN   2 mg at 04/14/22 0706     nicotine Patch in Place   Transdermal Q8H         OLANZapine (zyPREXA) tablet 10 mg  10 mg Oral TID PRN   10 mg at 04/13/22 1757    Or     OLANZapine (zyPREXA) injection 10 mg  10 mg Intramuscular TID PRN         polyethylene glycol (MIRALAX) Packet 17 g  17 g Oral Daily PRN         No current [facility]-ordered outpatient medications on file.              10 point ROS- denies concerns       Allergies:   No Known Allergies         Psychiatric Examination:   /64   Pulse 76   Temp 99.3  F (37.4  C) (Temporal)   Resp 16   Ht 1.803 m (5' 11\")   Wt 74.1 kg (163 lb 6.4 oz)   SpO2 96%   BMI 22.79 kg/m    Weight is 163 lbs 6.4 oz  Body mass index is 22.79 kg/m .    Appearance:  awake, alert, adequately groomed, dressed in hospital scrubs and appeared as age stated  Attitude:  cooperative and guarded  Eye Contact:  good  Mood:  anxious, paranoid  Affect:  mood congruent  Speech:  clear, coherent  Psychomotor Behavior:  no evidence of tardive dyskinesia, dystonia, or tics  Thought Process:  linear and goal oriented, illogical " "thoughts at times  Associations:  no loose associations  Thought Content:  no evidence of suicidal ideation or homicidal ideation, denies hallucinations currently though appears to be paranoid  Insight:  limited  Judgment:  fair  Oriented to:  time, person, and place  Attention Span and Concentration:  fair  Recent and Remote Memory:  fair  Fund of Knowledge: appropriate  Muscle Strength and Tone: normal  Gait and Station: Normal           Labs:   No results found for this or any previous visit (from the past 24 hour(s)).      BEHAVIORAL TEAM DISCUSSION    Progress: Slow improvement. Less disorganized today though paranoia remains.    Continued Stay Criteria/Rationale: monitor and treat symptoms of disorganization and psychosis. Restart and titrate Abilify.    Medical/Physical: none  Precautions:   Falls precaution?: No  Behavioral Orders   Procedures     Code 1 - Restrict to Unit     Elopement precautions     Pt states \"I don't like to be in locked facilities or people that  my way when attempting to leave\"     Routine Programming     As clinically indicated     Status 15     Every 15 minutes.     Plan:   Restart Abilify 5 mg daily, increase dose tomorrow  Would like Rule 25 completed- treatment  Voluntary        Rationale for change in precautions or plan: target psychosis      Participants: Shanice Turner, APRN, CNP, Dr. Haywood, SW, OT, Nursing  "

## 2022-04-14 NOTE — PLAN OF CARE
Face to face end of shift report received from Rayshawn WALKER RN. Rounding completed. Patient observed in hallway.     Pt has been calm, cooperative this shift. He remains confused and disorganized though I think it has improved since admission yesterday. He rambles and has flight of ideas which makes him difficult to track. He denied any SI/HI. He appears to be responding throughout the day. He was provided shower supplies today and encouraged to shower. He met with [facility] today and was appropriate. He has not gone to any groups this shift. He has slept for most of the morning. He denied any PRN medications, but accepted Stef Patch. Denies pain. Frequent rounding.     Problem: Behavioral Health Plan of Care  Goal: Patient-Specific Goal (Individualization)  Description: Pt. Will follow recommendations of treatment team during hospital stay.  Pt. Will sleep 6-8 hours per night during hospital stay.  Pt. Will attend > 50% of unit programing during hospital stay.   Pt. Will maintain ADLs without prompting during hospital stay.   Outcome: Ongoing, Progressing     Problem: Psychotic Symptoms  Goal: Psychotic Symptoms  Description: Signs and symptoms of listed problems will be absent or manageable.  Outcome: Ongoing, Not Progressing      Dara Purvis RN  4/14/2022  1:45 PM

## 2022-04-14 NOTE — PROGRESS NOTES
"CLINICAL NUTRITION SERVICES  -  ASSESSMENT NOTE    Tani Ramirez : Admission Nutrition Risk Screen - score of 2. Unsure of weight loss    31 yom admitted for psychosis. Pt has a past medical hx including anxiety, ADD, polysubstance abuse. Limited weights to assess if there has been any change. Current intake has been adequate.    Diet Order: Regular  Intake: 2 meals with 100% intake    Height: 5' 11\"  Weight: 163 lbs 6.4 oz  Body mass index is 22.79 kg/m .  Weight Status:  Normal BMI  Weight History:   Wt Readings from Last 10 Encounters:   04/13/22 74.1 kg (163 lb 6.4 oz)   05/09/19 87 kg (191 lb 12.8 oz)   04/21/19 80.7 kg (178 lb)      Malnutrition Diagnosis: Unable to determine due to limited data to assess weights.     NUTRITION RECOMMENDATIONS  - Encourage intake as needed    MONITORING AND EVALUATION:  RD will monitor intake, weight, labs      "

## 2022-04-14 NOTE — PLAN OF CARE
Problem: Behavioral Health Plan of Care  Goal: Patient-Specific Goal (Individualization)  Description: Pt. Will follow recommendations of treatment team during hospital stay.  Pt. Will sleep 6-8 hours per night during hospital stay.  Pt. Will attend > 50% of unit programing during hospital stay.   Pt. Will maintain ADLs without prompting during hospital stay.   Outcome: Ongoing, Progressing   Goal Outcome Evaluation:      Face to face shift report received from RN. Rounding completed, pt observed. Client rested in room for 7 hours with eyes closed and respirations noted. No signs of distress.Face to face report will be communicated to oncoming RN.    Rayshawn Coffey RN  4/14/2022  6:29 AM

## 2022-04-15 PROCEDURE — 124N000001 HC R&B MH

## 2022-04-15 PROCEDURE — 250N000011 HC RX IP 250 OP 636: Performed by: NURSE PRACTITIONER

## 2022-04-15 PROCEDURE — 250N000013 HC RX MED GY IP 250 OP 250 PS 637: Performed by: NURSE PRACTITIONER

## 2022-04-15 PROCEDURE — 99232 SBSQ HOSP IP/OBS MODERATE 35: CPT | Performed by: NURSE PRACTITIONER

## 2022-04-15 RX ORDER — LORAZEPAM 2 MG/ML
1 INJECTION INTRAMUSCULAR 3 TIMES DAILY PRN
Status: DISCONTINUED | OUTPATIENT
Start: 2022-04-15 | End: 2022-04-21

## 2022-04-15 RX ORDER — LORAZEPAM 2 MG/ML
0.5 INJECTION INTRAMUSCULAR 3 TIMES DAILY PRN
Status: DISCONTINUED | OUTPATIENT
Start: 2022-04-15 | End: 2022-04-15

## 2022-04-15 RX ORDER — BENZTROPINE MESYLATE 1 MG/ML
1 INJECTION, SOLUTION INTRAMUSCULAR; INTRAVENOUS 2 TIMES DAILY PRN
Status: DISCONTINUED | OUTPATIENT
Start: 2022-04-15 | End: 2022-04-22 | Stop reason: HOSPADM

## 2022-04-15 RX ORDER — LORAZEPAM 0.5 MG/1
0.5 TABLET ORAL 3 TIMES DAILY PRN
Status: DISCONTINUED | OUTPATIENT
Start: 2022-04-15 | End: 2022-04-15

## 2022-04-15 RX ORDER — LORAZEPAM 1 MG/1
1 TABLET ORAL 3 TIMES DAILY PRN
Status: DISCONTINUED | OUTPATIENT
Start: 2022-04-15 | End: 2022-04-21

## 2022-04-15 RX ORDER — IBUPROFEN 600 MG/1
600 TABLET, FILM COATED ORAL EVERY 6 HOURS PRN
Status: DISCONTINUED | OUTPATIENT
Start: 2022-04-15 | End: 2022-04-21

## 2022-04-15 RX ORDER — LORAZEPAM 0.5 MG/1
0.5 TABLET ORAL ONCE
Status: COMPLETED | OUTPATIENT
Start: 2022-04-15 | End: 2022-04-15

## 2022-04-15 RX ORDER — BENZTROPINE MESYLATE 1 MG/1
1 TABLET ORAL ONCE
Status: DISCONTINUED | OUTPATIENT
Start: 2022-04-15 | End: 2022-04-16

## 2022-04-15 RX ORDER — LORAZEPAM 2 MG/ML
0.5 INJECTION INTRAMUSCULAR ONCE
Status: COMPLETED | OUTPATIENT
Start: 2022-04-15 | End: 2022-04-15

## 2022-04-15 RX ORDER — BENZTROPINE MESYLATE 1 MG/1
1 TABLET ORAL 2 TIMES DAILY PRN
Status: DISCONTINUED | OUTPATIENT
Start: 2022-04-15 | End: 2022-04-22 | Stop reason: HOSPADM

## 2022-04-15 RX ORDER — BENZTROPINE MESYLATE 1 MG/ML
1 INJECTION, SOLUTION INTRAMUSCULAR; INTRAVENOUS ONCE
Status: DISCONTINUED | OUTPATIENT
Start: 2022-04-15 | End: 2022-04-16

## 2022-04-15 RX ADMIN — LORAZEPAM 0.5 MG: 2 INJECTION INTRAMUSCULAR; INTRAVENOUS at 16:07

## 2022-04-15 RX ADMIN — BENZTROPINE MESYLATE 1 MG: 1 INJECTION INTRAMUSCULAR; INTRAVENOUS at 15:50

## 2022-04-15 RX ADMIN — NICOTINE POLACRILEX 2 MG: 2 GUM, CHEWING BUCCAL at 08:37

## 2022-04-15 RX ADMIN — NICOTINE 1 PATCH: 21 PATCH, EXTENDED RELEASE TRANSDERMAL at 08:45

## 2022-04-15 RX ADMIN — ARIPIPRAZOLE 5 MG: 5 TABLET ORAL at 08:38

## 2022-04-15 RX ADMIN — LORAZEPAM 0.5 MG: 0.5 TABLET ORAL at 17:13

## 2022-04-15 RX ADMIN — NICOTINE POLACRILEX 2 MG: 2 GUM, CHEWING BUCCAL at 11:06

## 2022-04-15 ASSESSMENT — ACTIVITIES OF DAILY LIVING (ADL)
ORAL_HYGIENE: INDEPENDENT
HYGIENE/GROOMING: INDEPENDENT
DRESS: INDEPENDENT
LAUNDRY: UNABLE TO COMPLETE

## 2022-04-15 NOTE — PLAN OF CARE
"  Problem: Behavioral Health Plan of Care  Goal: Patient-Specific Goal (Individualization)  Description: Pt. Will follow recommendations of treatment team during hospital stay.  Pt. Will sleep 6-8 hours per night during hospital stay.  Pt. Will attend > 50% of unit programing during hospital stay.   Pt. Will maintain ADLs without prompting during hospital stay.   Outcome: Ongoing, Not Progressing  Note: Report received from Rayshawn RN.  Patient is awake and in the lounge at start of shift.    Cooperative with staff and nursing assessment.  Limited information gathered during nursing assessment r/t disorganized thought process.  Patient denies SI, HI.  He is unable to recall this writer's name name after a few minutes.  He is unable to recall conversations that occurred earlier in the shift.  He is disoriented to place, date, and situation.    Prior to lunch, this writer was notified of /101 via monitor, pulse 76.    1145: Patient c/o feeling \"heavy\" and \"strange\". Manual BP at this time 148/94, apical pulse 80. C/o mild dizziness.  Had patient sitting in a chair in the lounge area where he could be monitored from nurse's station while this writer called NP.  Patient attempted to stand but states he was \"dizzy\" and sat back down in chair suddenly. C/o feeling anxious but is \"always anxious\". Staff with patient r/t this.   1148: Patient appear uncomfortable/fatigued. Per NP-Patient received first dose of Abilify 5 mg po this morning. Push fluids, monitor for potential side effects of medication, obtain vs again after lunch. Staff brought patient's lunch to his room.  Tap bell and education provided.  No cogwheeling noted.   1300:  Patient reporting he fells better after eating and drinking water. He is awake and walking in the lounge area. Gait is balanced and steady now. He reports drinking many cups of coffee this morning.  BP 1332/82, pulse 86.   Will continue to monitor.   This writer reported to next shift.  " After exiting report, charge nurse reports patient has cogwheeling and NP was notified orders to follow. This writer sitting with patient to monitor while next shift's RN prepared medications.  Manual /96.  Cogwheeling and tongue protrusion present. See note from next shift's Rn Dara.      Problem: Psychotic Symptoms  Goal: Psychotic Symptoms  Description: Signs and symptoms of listed problems will be absent or manageable.  Outcome: Ongoing, Not Progressing

## 2022-04-15 NOTE — PLAN OF CARE
"  Problem: Behavioral Health Plan of Care  Goal: Patient-Specific Goal (Individualization)  Description: Pt. Will follow recommendations of treatment team during hospital stay.  Pt. Will sleep 6-8 hours per night during hospital stay.  Pt. Will attend > 50% of unit programing during hospital stay.   Pt. Will maintain ADLs without prompting during hospital stay.   Outcome: Ongoing, Progressing  Note: Pt had a blunted, flat affect. Pt's speech pattern was disorganized. Pt unable to answer assessment questions with direct answers tonight - pt had flight of ideas, rambling, and tangential speech. He had difficulty organizing his thoughts. Pt had some paranoia tonight. He stated \"I feel like people are betting on what I do and say. I feel like I recognize a lot of people up here. You look like one of my ex's older sisters.\" Pt ate 100% of supper tonight.     2043 - Pt received 10 mg of PRN Zyprexa for ongoing pychosis and 3 mg of PRN Melatonin for sleep.     Face to face end of shift report communicated to oncoming RN.   "

## 2022-04-15 NOTE — PLAN OF CARE
Problem: Behavioral Health Plan of Care  Goal: Patient-Specific Goal (Individualization)  Description: Pt. Will follow recommendations of treatment team during hospital stay.  Pt. Will sleep 6-8 hours per night during hospital stay.  Pt. Will attend > 50% of unit programing during hospital stay.   Pt. Will maintain ADLs without prompting during hospital stay.   Outcome: Ongoing, Progressing   Goal Outcome Evaluation:      Face to face shift report received from RN. Rounding completed, pt observed. Client rested in room for 5 hours with eyes closed and respirations noted. No signs of distress. Face to face report will be communicated to oncoming RN.    Rayshawn Coffey RN  4/15/2022  6:12 AM

## 2022-04-16 PROCEDURE — 124N000001 HC R&B MH

## 2022-04-16 PROCEDURE — 250N000013 HC RX MED GY IP 250 OP 250 PS 637: Performed by: NURSE PRACTITIONER

## 2022-04-16 PROCEDURE — 99233 SBSQ HOSP IP/OBS HIGH 50: CPT | Performed by: NURSE PRACTITIONER

## 2022-04-16 RX ADMIN — GABAPENTIN 300 MG: 300 CAPSULE ORAL at 14:33

## 2022-04-16 RX ADMIN — NICOTINE POLACRILEX 2 MG: 2 GUM, CHEWING BUCCAL at 08:27

## 2022-04-16 RX ADMIN — HYDROXYZINE HYDROCHLORIDE 50 MG: 25 TABLET, FILM COATED ORAL at 14:33

## 2022-04-16 RX ADMIN — MELATONIN 3 MG: at 20:31

## 2022-04-16 RX ADMIN — NICOTINE 1 PATCH: 21 PATCH, EXTENDED RELEASE TRANSDERMAL at 08:27

## 2022-04-16 RX ADMIN — NICOTINE POLACRILEX 2 MG: 2 GUM, CHEWING BUCCAL at 12:18

## 2022-04-16 RX ADMIN — LORAZEPAM 1 MG: 1 TABLET ORAL at 15:05

## 2022-04-16 RX ADMIN — BENZTROPINE MESYLATE 1 MG: 1 TABLET ORAL at 18:34

## 2022-04-16 RX ADMIN — BENZTROPINE MESYLATE 1 MG: 1 TABLET ORAL at 11:33

## 2022-04-16 RX ADMIN — NICOTINE POLACRILEX 2 MG: 2 GUM, CHEWING BUCCAL at 15:07

## 2022-04-16 ASSESSMENT — ACTIVITIES OF DAILY LIVING (ADL)
LAUNDRY: UNABLE TO COMPLETE
ORAL_HYGIENE: INDEPENDENT
DRESS: INDEPENDENT
HYGIENE/GROOMING: INDEPENDENT
DRESS: SCRUBS (BEHAVIORAL HEALTH);INDEPENDENT
HYGIENE/GROOMING: INDEPENDENT
ORAL_HYGIENE: INDEPENDENT
LAUNDRY: UNABLE TO COMPLETE

## 2022-04-16 NOTE — PLAN OF CARE
Face to face end of shift report will be communicated to oncoming RN.    Problem: Behavioral Health Plan of Care  Goal: Patient-Specific Goal (Individualization)  Description: Pt. Will follow recommendations of treatment team during hospital stay.  Pt. Will sleep 6-8 hours per night during hospital stay.  Pt. Will attend > 50% of unit programing during hospital stay.   Pt. Will maintain ADLs without prompting during hospital stay.   Outcome: Ongoing, Progressing     Problem: Psychotic Symptoms  Goal: Psychotic Symptoms  Description: Signs and symptoms of listed problems will be absent or manageable.  Outcome: Ongoing, Progressing    Face to face end of shift report obtained from RUBEN Diamond. Pt observed resting in bed. Pt appears to be sleeping in bed with eyes closed, having regular respirations, and position changes. 15 minutes and PRN safety checks completed with no noted complains. No psychotic signs noted or reported so far this shift.   0600-Pt appeared to had slept all night.

## 2022-04-16 NOTE — PROGRESS NOTES
"[facility]  Psychiatric Progress Note      Impression:    This is a 31 year old yo male with a history of unspecified psychosis, bipolar disorder, polysubstance abuse, and chronic hep C+ who was brought to the ED after he was found to be disorganized and confused at a gas station. Did admit to methamphetamine use a few days ago, utox only positive for THC. Today it is difficult to get much history from him as he is irritable and just wants to sleep. He does voice an interest in CD treatment. Denies taking any medications. During a previous hospital stay in 2019 he was on a stay of commitment and stabilized on Abilify and Gabapentin. Will attempt to discuss these medications with the patient tomorrow after he is able to sleep and may be more agreeable to speaking with me. Will see if he can have a Rule 25 completed while here.          Interim History:   Tani is in his room when I see him today. He did discuss some of his drug use, stating in the past few weeks he has snorted both cocaine and methamphetamine, \"at least I think that is what it was\". He states that he uses \"a lot\" of drugs, whatever he can get. He does seem confused with my questions at times, possible thought blocking. He is aware that he is in the hospital though did not remember that he was in [place]. He was started on Abilify this morning, appears he has been on this in the past. Reports he slept well. Does report a headache, will add Ibuprofen.     Educated regarding medication indications, risks, benefits, side effects, contraindications and possible interactions. Verbally expressed understanding.        Diagnoses:   Unspecified psychosis  R/o substance induced psychosis  R/o bipolar 1 disorder (by history)  Stimulant (methamphetamine) use disorder, likely moderate to severe  Chronic hep C+      Attestation:  Patient has been seen and evaluated by me,  Shanice Turner NP      The patient's care was discussed with the treatment " "team and chart notes were reviewed.            Medications:     Current Facility-Administered Medications Ordered in Epic   Medication Dose Route Frequency Last Rate Last Admin     acetaminophen (TYLENOL) tablet 650 mg  650 mg Oral Q4H PRN         alum & mag hydroxide-simethicone (MAALOX) suspension 30 mL  30 mL Oral Q4H PRN         hydrOXYzine (ATARAX) tablet 50 mg  50 mg Oral Q4H PRN         melatonin tablet 3 mg  3 mg Oral At Bedtime PRN         nicotine (NICODERM CQ) 21 MG/24HR 24 hr patch 1 patch  1 patch Transdermal Daily         nicotine (NICORETTE) gum 2 mg  2 mg Buccal Q1H PRN   2 mg at 04/14/22 0706     nicotine Patch in Place   Transdermal Q8H         OLANZapine (zyPREXA) tablet 10 mg  10 mg Oral TID PRN   10 mg at 04/13/22 1757    Or     OLANZapine (zyPREXA) injection 10 mg  10 mg Intramuscular TID PRN         polyethylene glycol (MIRALAX) Packet 17 g  17 g Oral Daily PRN         No current [facility]-ordered outpatient medications on file.              10 point ROS- HA       Allergies:   No Known Allergies         Psychiatric Examination:   /76 (BP Location: Right arm)   Pulse 92   Temp 97.8  F (36.6  C) (Tympanic)   Resp 16   Ht 1.803 m (5' 11\")   Wt 71.8 kg (158 lb 6.4 oz)   SpO2 99%   BMI 22.09 kg/m    Weight is 158 lbs 6.4 oz  Body mass index is 22.09 kg/m .    Appearance:  awake, alert, adequately groomed, dressed in hospital scrubs and appeared as age stated  Attitude:  Cooperative, pleasant  Eye Contact:  good  Mood:  anxious  Affect:  mood congruent  Speech:  clear, coherent, some delay  Psychomotor Behavior:  no evidence of tardive dyskinesia, dystonia, or tics  Thought Process:  linear and goal oriented, illogical thoughts at times  Associations:  no loose associations  Thought Content:  no evidence of suicidal ideation or homicidal ideation, denies hallucinations currently though appears to be paranoid  Insight:  limited  Judgment:  fair  Oriented to:  time, person, and " "place  Attention Span and Concentration:  fair  Recent and Remote Memory:  fair  Fund of Knowledge: appropriate for education  Muscle Strength and Tone: normal  Gait and Station: Normal           Labs:   No results found for this or any previous visit (from the past 24 hour(s)).      BEHAVIORAL TEAM DISCUSSION    Progress: Slow improvement. Less disorganized today, some thought blocking evident.     Continued Stay Criteria/Rationale: monitor and treat symptoms of disorganization and psychosis. Restart and titrate Abilify.     Medical/Physical: none  Precautions:   Falls precaution?: No  Behavioral Orders   Procedures     Code 1 - Restrict to Unit     Elopement precautions     Pt states \"I don't like to be in locked facilities or people that  my way when attempting to leave\"     Routine Programming     As clinically indicated     Status 15     Every 15 minutes.     Plan:   Restart Abilify 5 mg daily  Add Ibuprofen PRN headache  Would like Rule 25 completed- treatment  Voluntary        Rationale for change in precautions or plan: target psychosis      Participants: Shanice Turner, APRN, CNP    Nursing  "

## 2022-04-16 NOTE — PLAN OF CARE
"Face to face end of shift report received from Laura PRO RN. Rounding completed. Patient observed in group.     Pt has been calm, cooperative this shift. He has been participating in unit milieu. He denied any SI/HI. He does appear slightly preoccupied during our conversation. When asked about auditory hallucinations he responds \"I'm not sure, I think so\". Pt states that his muscles are still a little sore, but he went on the stationary bike and stated this helped. He did request medication for this and received Cogentin PRN. He has gone to groups throughout the shift. Behaviors have been appropriate and engages with staff/peers.     Pt stated that \"Whatever the medication was that you guys gave me I think is really working\". Pt does not appear confused or disorganized and is able to carry on a conversation appropriately.     Pt concerned with where his vehicle is at and questioned if he could call to find out. He stated that [employer] \"\" was the one to ask the gas station if it was okay to leave the vehicle there. Pt requested \"[employer]\" phone number as well and this was provided for him. Pt also concerned that he doesn't think he anyone knows he is here. Encouraged to call mom, dad, and Janiya (ex fiance).     Pt received Melatonin for sleep aide and went to lay down. Pt is able to make needs known. Frequent rounding.     Problem: Behavioral Health Plan of Care  Goal: Patient-Specific Goal (Individualization)  Description: Pt. Will follow recommendations of treatment team during hospital stay.  Pt. Will sleep 6-8 hours per night during hospital stay.  Pt. Will attend > 50% of unit programing during hospital stay.   Pt. Will maintain ADLs without prompting during hospital stay.   Outcome: Ongoing, Progressing     Problem: Psychotic Symptoms  Goal: Psychotic Symptoms  Description: Signs and symptoms of listed problems will be absent or manageable.  Outcome: Ongoing, Progressing   "     Dara Purvis RN  4/16/2022  5:07 PM

## 2022-04-16 NOTE — PLAN OF CARE
"  Problem: Behavioral Health Plan of Care  Goal: Patient-Specific Goal (Individualization)  Description: Pt. Will follow recommendations of treatment team during hospital stay.  Pt. Will sleep 6-8 hours per night during hospital stay.  Pt. Will attend > 50% of unit programing during hospital stay.   Pt. Will maintain ADLs without prompting during hospital stay.   4/16/2022 1117 by Laura Schwab RN  Outcome: Ongoing, Progressing  Note: Report received from Teressa MIRANDA.  Rounding complete.  Patient is in bed and asleep at start of shift.    Patient is awake for breakfast meal.  Cooperative with medications and assessment.  He wants to have a Rule 25 and go to CD treatment.  He understands he was \"confused\" when he was first admitted to this floor and needed to clear before a Rule 25 could be done.   Patient had possible dystonic reaction yesterday afternoon. Upon assessment this morning he had no noted cog wheeling, muscle spasticity, muscle contractures, or tongue protrusion. When asked if he remembers anything during yesterday's possible dystonic reaction, he states \"not really\".  \"I remember the bottom of my foot cramping and then my legs. I remember telling myself to 'just breathe'.  I heard a lot of people talking.  I know you were there and you helped me. Then I was awake.  I made a goal \"  He becomes tearful stating \"You remind me of someone I knew in the past.\" He would not elaborate.    1133:  Patient reports some lingering muscle stiffness in bilateral lower legs. Per NP: benztropine PRN can be administered for movement disorder or EPSE.  PRN benztropine 1 mg po administered at this time.   1245:  Patient reports a decrease in leg stiffness.    Blood pressure 141/90, pulse 96, tympanic temperature 98.9  F (37.2  C), resp. rate 16, SpO2 96 %.  1433:  Patient sitting in the hallway and is tearful.  Initially he is hesitant to talk about \"what's going on\".  He states he \"knows what is going on. I need to know " "if I have siblings and if I'm an organ donor.\"  He talks about being the \"fall man\", snapchat filters changing people's identity and giving them the ability to hack into secure networks.  PRN gabapentin 300 mg and hydroxyzine 50 mg po administered for anxiety.   1505:  After conversation with NP: PRN lorazepam 1 mg po administered for anxiety.  Passed on the next shift to document effectiveness.        Problem: Psychotic Symptoms  Goal: Psychotic Symptoms  Description: Signs and symptoms of listed problems will be absent or manageable.  Outcome: Ongoing, Progressing  Note:   Patient continues to have some disorganized thoughts and resulting confusion.  This has improved since yesterday morning.                     "

## 2022-04-16 NOTE — PLAN OF CARE
"Face to face end of shift report received from Laura PRO RN. Rounding completed. Patient observed in bed, awake.    Upon introduction, pt observed in bed with what appeared to be a dystonic reaction. Muscle spacticity, muscle contractures, tongue protrusion, and cog wheeling. Pt was administered Cogentin IM at 1550 in R gluteal with little results. Pt was then administered Ativan 0.5 mg at 1607. Patient's symptoms slowly started to decrease and he began to come around/started responding back to staff. Pt was able to sit up and ambulated to the bathroom. Slightly unsteady on his feet at first, but did not have any falls. Pt reported that his \"head is pounding and my muscles hurt everywhere\". Pt ate 100 % of dinner. He did report he still felt his muscles were \"tight\". Administered Ativan 0.5 mg at 1713. Pt went to group at 1800. This writer sathya to check on patient and he was doing very well. He wasn't disorganized, his speech was logical, and he was able to engage and carry on a conversation with [facility], this writer, and peer. Pt stated that he does remember being on Abilify and this has happened to him before \"But it wasn't anything like what it was. I remember I it would feel tense in my thighs all the time\". Pt states he remembers when his feet were contracted, but otherwise doesn't really remember episode. Pt remained in group for more than an hour and then went to bed.    Pt was questioning what the plan was for him and if he was going to go to treatment. I informed him that he was voluntary and he questioned if he could just leaving then. Explained the 12 hour intent and informed him that he would more than likely be placed on a 72 hour hold. Pt was accepting of this information.      Problem: Behavioral Health Plan of Care  Goal: Patient-Specific Goal (Individualization)  Description: Pt. Will follow recommendations of treatment team during hospital stay.  Pt. Will sleep 6-8 hours per night during hospital " stay.  Pt. Will attend > 50% of unit programing during hospital stay.   Pt. Will maintain ADLs without prompting during hospital stay.   Outcome: Ongoing, Progressing     Problem: Psychotic Symptoms  Goal: Psychotic Symptoms  Description: Signs and symptoms of listed problems will be absent or manageable.  Outcome: Ongoing, Progressing     Problem: Psychotic Symptoms  Goal: Social and Therapeutic (Psychotic Symptoms)  Description: Signs and symptoms of listed problems will be absent or manageable.  Outcome: Ongoing, Progressing       Dara Purvis RN  4/15/2022  7:46 PM

## 2022-04-17 PROCEDURE — 250N000013 HC RX MED GY IP 250 OP 250 PS 637: Performed by: NURSE PRACTITIONER

## 2022-04-17 PROCEDURE — 124N000001 HC R&B MH

## 2022-04-17 RX ADMIN — NICOTINE POLACRILEX 2 MG: 2 GUM, CHEWING BUCCAL at 14:38

## 2022-04-17 RX ADMIN — HYDROXYZINE HYDROCHLORIDE 50 MG: 25 TABLET, FILM COATED ORAL at 07:28

## 2022-04-17 RX ADMIN — LORAZEPAM 1 MG: 1 TABLET ORAL at 18:45

## 2022-04-17 RX ADMIN — NICOTINE 1 PATCH: 21 PATCH, EXTENDED RELEASE TRANSDERMAL at 09:04

## 2022-04-17 RX ADMIN — NICOTINE POLACRILEX 2 MG: 2 GUM, CHEWING BUCCAL at 17:35

## 2022-04-17 RX ADMIN — NICOTINE POLACRILEX 2 MG: 2 GUM, CHEWING BUCCAL at 09:04

## 2022-04-17 RX ADMIN — LORAZEPAM 1 MG: 1 TABLET ORAL at 09:09

## 2022-04-17 RX ADMIN — NICOTINE POLACRILEX 2 MG: 2 GUM, CHEWING BUCCAL at 07:15

## 2022-04-17 RX ADMIN — MELATONIN 3 MG: at 20:11

## 2022-04-17 ASSESSMENT — ACTIVITIES OF DAILY LIVING (ADL)
HYGIENE/GROOMING: INDEPENDENT
DRESS: SCRUBS (BEHAVIORAL HEALTH);INDEPENDENT
LAUNDRY: UNABLE TO COMPLETE
HYGIENE/GROOMING: INDEPENDENT
DRESS: INDEPENDENT
ORAL_HYGIENE: INDEPENDENT
LAUNDRY: UNABLE TO COMPLETE
ORAL_HYGIENE: INDEPENDENT

## 2022-04-17 NOTE — PLAN OF CARE
"Face to face end of shift report received from Laura PRO RN. Rounding completed. Patient observed in [place].     Pt has been calm, cooperative this shift. He is very disorganized and paranoid again today. Pt states that he feels that he is better. He engages in conversation regarding the location of his car and if his family members are aware that he is here. He answers appropriately, but then becomes delusional and tangential. Difficult to follow. Believes that his phone won't work properly because of the war that is going on. Believes that nobody will believe where he is at or his car is at because what's going on in the world.     Pt denied any SI/HI or AH/VH. He does appear to be preoccupied though. He denied any pain. He requests Melatonin with his HS medications. However, it was a little after dinner so too early. Questioned if he was having anxiety and he shook his head. Administered Ativan 1 mg 1845. He sat out in [place] and had snack.     Later requested Melatonin which he received. Then he motioned for this writer to come to his room. Pt questioned whether babies have black tarry stools when born because he thinks that his ex was doing heroin and that it was building up in the babies stomach. Pt difficult to track talking about [employer], 5th dimensions, his children, and eventually going back to discuss meconium. Pt eventually stated \"Okay that's enough for tonight. Please don't go and document all that because they'll think i'm crazy as all hell and keep me in here for forever\".     Problem: Behavioral Health Plan of Care  Goal: Patient-Specific Goal (Individualization)  Description: Pt. Will follow recommendations of treatment team during hospital stay.  Pt. Will sleep 6-8 hours per night during hospital stay.  Pt. Will attend > 50% of unit programing during hospital stay.   Pt. Will maintain ADLs without prompting during hospital stay.   Outcome: Ongoing, Progressing     Problem: Psychotic " Symptoms  Goal: Psychotic Symptoms  Description: Signs and symptoms of listed problems will be absent or manageable.  Outcome: Ongoing, Progressing     Problem: Psychotic Symptoms  Goal: Social and Therapeutic (Psychotic Symptoms)  Description: Signs and symptoms of listed problems will be absent or manageable.  Outcome: Ongoing, Progressing       Dara Purvis RN  4/17/2022  5:54 PM

## 2022-04-17 NOTE — PLAN OF CARE
"  Problem: Behavioral Health Plan of Care  Goal: Patient-Specific Goal (Individualization)  Description: Pt. Will follow recommendations of treatment team during hospital stay.  Pt. Will sleep 6-8 hours per night during hospital stay.  Pt. Will attend > 50% of unit programing during hospital stay.   Pt. Will maintain ADLs without prompting during hospital stay.   Outcome: Ongoing, Not Progressing  Note: Report received from Sussy MIRANDA.  Rounding complete.  Patient observed walking in the hallway.   Patient denies SI, HI, pain.  Patient is distractible and somewhat paranoid this morning.  His thought process appears disorganized.  Verbal responses to assessment questions are somewhat delayed.  When talking to this writer in the hallway, patient stopped talking mid sentence.  \"It's too weird. I swear someone was standing over there.\"  He motioned to an area a few feet away from him.  \"I looked down for a second.  When I looked back up they were gone. I don't know what's happening.\"   There wasn't anyone standing near patient.  He reported the medication he received at the end of day shift yesterday was effective in decreasing confusion and anxiety.  That medication was PRN lorazepam.  Offered PRN lorazepam this AM for anxiety as it was effective for him yesterday.  \"Is that what I'm supposed to do?  Who ordered it?\" Told him medication was ordered by his provider Shanice. \"Wait. Provider? She's not an insurance provider is she? She's a real person?\" Explained that she is a real person.  He did agree to take PRN lorazepam.  0909:  Administered PRN lorazepam 1 mg po for anxiety.  1100:  Patient is visibly less anxious and restless.  He continues to make paranoid statements.   1300:  Patient at nurse's station window asking \"Do you have any idea why someone would give someone else pills with paw prints on them do you? That other girl gave me 2 brown pills with paw prints on them a while ago.  She must be gone now.\"  He " "asks what medications he has taken today.  He remembers this writer giving him PRN lorazepam this morning.  The prior shift administered PRN hydroxyzine.  He appears frustrated and somewhat irritable.  He states those are not the medications he is referring to. \"Well I want to look at My Chart then.\"  Explained information from this hospitalization will not [email or web address] until after discharge.  \"When can I discharge?\"  Reminded him that he wanted a Rule 25 and to go to CD treatment.  He agrees with this and is not asking to discharge at this time.      Problem: Psychotic Symptoms  Goal: Psychotic Symptoms  Description: Signs and symptoms of listed problems will be absent or manageable.  Outcome: Ongoing, Not Progressing  Note: Patient continues to have disorganized thought process.  Plan of Care Reviewed With: patient         "

## 2022-04-18 LAB
AMMONIA PLAS-SCNC: 21 UMOL/L (ref 10–50)
HOLD SPECIMEN: NORMAL

## 2022-04-18 PROCEDURE — 99232 SBSQ HOSP IP/OBS MODERATE 35: CPT | Performed by: NURSE PRACTITIONER

## 2022-04-18 PROCEDURE — 36415 COLL VENOUS BLD VENIPUNCTURE: CPT | Performed by: NURSE PRACTITIONER

## 2022-04-18 PROCEDURE — 124N000001 HC R&B MH

## 2022-04-18 PROCEDURE — 250N000013 HC RX MED GY IP 250 OP 250 PS 637: Performed by: NURSE PRACTITIONER

## 2022-04-18 PROCEDURE — 82140 ASSAY OF AMMONIA: CPT | Performed by: NURSE PRACTITIONER

## 2022-04-18 RX ORDER — QUETIAPINE FUMARATE 50 MG/1
50 TABLET, FILM COATED ORAL 3 TIMES DAILY PRN
Status: DISCONTINUED | OUTPATIENT
Start: 2022-04-18 | End: 2022-04-22 | Stop reason: HOSPADM

## 2022-04-18 RX ADMIN — NICOTINE 1 PATCH: 21 PATCH, EXTENDED RELEASE TRANSDERMAL at 08:12

## 2022-04-18 RX ADMIN — GABAPENTIN 300 MG: 300 CAPSULE ORAL at 18:51

## 2022-04-18 RX ADMIN — HYDROXYZINE HYDROCHLORIDE 50 MG: 25 TABLET, FILM COATED ORAL at 13:13

## 2022-04-18 RX ADMIN — NICOTINE POLACRILEX 2 MG: 2 GUM, CHEWING BUCCAL at 09:40

## 2022-04-18 ASSESSMENT — ACTIVITIES OF DAILY LIVING (ADL)
LAUNDRY: UNABLE TO COMPLETE
ORAL_HYGIENE: INDEPENDENT
DRESS: SCRUBS (BEHAVIORAL HEALTH);INDEPENDENT
HYGIENE/GROOMING: INDEPENDENT
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
## 2022-04-18 NOTE — PLAN OF CARE
"  Problem: Behavioral Health Plan of Care  Goal: Patient-Specific Goal (Individualization)  Description: Pt. Will follow recommendations of treatment team during hospital stay.  Pt. Will sleep 6-8 hours per night during hospital stay.  Pt. Will attend > 50% of unit programing during hospital stay.   Pt. Will maintain ADLs without prompting during hospital stay.   Outcome: Ongoing, Not Progressing      Patient is Alert, able to make needs known. Does contact staff and come up to nurses station with request and questions. VSS, afebrile. Assessment and vitals as charted.   Presents with flat, blunt affect. Appears sad, tearful. Is disorganized, and paranoid. Denies all mental health criteria with the exception of anxiety. Ask writer, \"where are my parents? I'm not sure if they are my real parents. Or if I am real? The snow is real? The lights?\" He is crying during this conversation. Reassurance given. He than states, \"they keep telling me things and I just don't know if they are real?\" I asked him who \"they\" are and if he is hearing voices. He changes subjects and does not answer the questions. He does appear to be responding to internal stimuli. He does recall some current events at times. Requested PRN atarax 50 mg PO at 1313 for anxiety.     Steady gait. No falls. Appetite fair. Reports sleeping well. Has been ambulating the hallway. Social with peers at times, otherwise keeps to himself.       End of shift report given to RUBEN Jim.     "

## 2022-04-18 NOTE — PLAN OF CARE
Problem: Psychotic Symptoms  Goal: Psychotic Symptoms  Description: Signs and symptoms of listed problems will be absent or manageable.  Outcome: Ongoing, Progressing    Pt continues to be afraid, confused, and is hallucinating at times.     Problem: Behavioral Health Plan of Care  Goal: Patient-Specific Goal (Individualization)  Description: Pt. Will follow recommendations of treatment team during hospital stay.  Pt. Will sleep 6-8 hours per night during hospital stay.  Pt. Will attend > 50% of unit programing during hospital stay.   Pt. Will maintain ADLs without prompting during hospital stay.   Outcome: Ongoing, Progressing   Goal Outcome Evaluation:    1530 face to face rounding complete.  Pt introduced to nursing for the shift.    Pt was up in his room and in the dayroom about half of the evening.  He appears very confused at times wandering into and out of group and his room. He was very upset when getting his blood drawn needing a great deal of assurance.  He asked for something for his anxiety and fear and was given PRN Gabapentin at 1851.  PT had some benefit from this. He was offered PRN Seroquel a few times but declined. He asked me to come into his room and he said that there was a presence or spirit in his room.  He wanted a night light and I turned it on for him.  Pt was able to rest some but wakes up frequently.  Pt was oriented to self tonight, but struggled with time, place, and situation.    2300 Face to face end of shift report communicated to Night shift RN's along with Pt's fall risk.     Hernán Hernandez RN  4/18/2022

## 2022-04-18 NOTE — PLAN OF CARE
Face to face end of shift report will be communicated to oncoming RN.     Problem: Behavioral Health Plan of Care  Goal: Patient-Specific Goal (Individualization)  Description: Pt. Will follow recommendations of treatment team during hospital stay.  Pt. Will sleep 6-8 hours per night during hospital stay.  Pt. Will attend > 50% of unit programing during hospital stay.   Pt. Will maintain ADLs without prompting during hospital stay.   Outcome: Ongoing, Progressing     Problem: Psychotic Symptoms  Goal: Social and Therapeutic (Psychotic Symptoms)  Description: Signs and symptoms of listed problems will be absent or manageable.  Outcome: Ongoing, Progressing     Problem: Psychotic Symptoms  Goal: Psychotic Symptoms  Description: Signs and symptoms of listed problems will be absent or manageable.  Outcome: Ongoing, Progressing   Goal Outcome Evaluation:  Face to face end of shift report obtained from RUBEN Diamond. Pt observed resting in bed. Pt appears to be sleeping in bed with eyes closed, having regular respirations, and position changes. 15 minutes and PRN safety checks completed with no noted complains. No psychotic signs noted or reported so far this shift.   0600-Pt appeared to had slept 6.5 hours so far this shift.

## 2022-04-18 NOTE — PROGRESS NOTES
[facility]  Psychiatric Progress Note      Impression:    This is a 31 year old yo male with a history of unspecified psychosis, bipolar disorder, polysubstance abuse, and chronic hep C+ who was brought to the ED after he was found to be disorganized and confused at a gas station. Did admit to methamphetamine use a few days ago, utox only positive for THC. Today it is difficult to get much history from him as he is irritable and just wants to sleep. He does voice an interest in CD treatment. Denies taking any medications. During a previous hospital stay in 2019 he was on a stay of commitment and stabilized on Abilify and Gabapentin. Will attempt to discuss these medications with the patient tomorrow after he is able to sleep and may be more agreeable to speaking with me. Will see if he can have a Rule 25 completed while here.          Interim History:   Tani is in his room when I see him today. He has been up pacing the halls and socializing with peers. He tells me that he is sleeping well at night. He continues to make bizarre statements about random things such as Vickie, the dark web, and clones. He states that he was told that he has an exact twin, though is not sure where he could find this person as he has never seen him before. He does get tearful for brief periods during our conversation. We do discuss trying a different medication that may help with his paranoia and high anxiety. He is agreeable to try Seroquel, though is somewhat hesitant due to previous reaction to Abilify. Will add this as a PRN medication that he can try, if he perceives benefit will likely schedule. Does have Cogentin PRN for EPSE.       Educated regarding medication indications, risks, benefits, side effects, contraindications and possible interactions. Verbally expressed understanding.        Diagnoses:   Unspecified psychosis  R/o substance induced psychosis  R/o bipolar 1 disorder (by history)  Stimulant  "(methamphetamine) use disorder, likely moderate to severe  Chronic hep C+      Attestation:  Patient has been seen and evaluated by me,  Shanice Turner NP      The patient's care was discussed with the treatment team and chart notes were reviewed.            Medications:     Current Facility-Administered Medications Ordered in Epic   Medication Dose Route Frequency Last Rate Last Admin     acetaminophen (TYLENOL) tablet 650 mg  650 mg Oral Q4H PRN         alum & mag hydroxide-simethicone (MAALOX) suspension 30 mL  30 mL Oral Q4H PRN         hydrOXYzine (ATARAX) tablet 50 mg  50 mg Oral Q4H PRN         melatonin tablet 3 mg  3 mg Oral At Bedtime PRN         nicotine (NICODERM CQ) 21 MG/24HR 24 hr patch 1 patch  1 patch Transdermal Daily         nicotine (NICORETTE) gum 2 mg  2 mg Buccal Q1H PRN   2 mg at 04/14/22 0706     nicotine Patch in Place   Transdermal Q8H         OLANZapine (zyPREXA) tablet 10 mg  10 mg Oral TID PRN   10 mg at 04/13/22 1757    Or     OLANZapine (zyPREXA) injection 10 mg  10 mg Intramuscular TID PRN         polyethylene glycol (MIRALAX) Packet 17 g  17 g Oral Daily PRN         No current [facility]-ordered outpatient medications on file.              10 point ROS- denies concerns today       Allergies:     Allergies   Allergen Reactions     Abilify [Aripiprazole]      Dystonic reaction            Psychiatric Examination:   /98   Pulse 109   Temp 97.2  F (36.2  C)   Resp 16   Ht 1.803 m (5' 11\")   Wt 71.8 kg (158 lb 6.4 oz)   SpO2 97%   BMI 22.09 kg/m    Weight is 158 lbs 6.4 oz  Body mass index is 22.09 kg/m .    Appearance:  Awake, alert, dressed in hospital scrubs, casually groomed  Attitude:  Cooperative, pleasant  Eye Contact:  good  Mood:  anxious  Affect:  mood congruent, tearful  Speech:  clear, coherent, some delay  Psychomotor Behavior:  no evidence of tardive dyskinesia, dystonia, or tics  Thought Process:  linear and goal oriented, illogical thoughts at times  Associations: " " no loose associations  Thought Content:  no evidence of suicidal ideation or homicidal ideation, denies hallucinations currently though continues to be paranoid  Insight:  limited  Judgment:  limited  Oriented to:  time, person, and place  Attention Span and Concentration:  fair  Recent and Remote Memory:  fair  Fund of Knowledge: appropriate for education  Muscle Strength and Tone: normal  Gait and Station: Normal           Labs:   No results found for this or any previous visit (from the past 24 hour(s)).      BEHAVIORAL TEAM DISCUSSION    Progress: Slow improvement. Less disorganized, though paranoia and delusions still present and have not improved off substances.    Continued Stay Criteria/Rationale: monitor and treat symptoms of disorganization and psychosis. Abilify stopped d/t dystonia, monitor for further symptoms and look at starting Seroquel.     Medical/Physical: none  Precautions:   Falls precaution?: No  Behavioral Orders   Procedures     Code 1 - Restrict to Unit     Elopement precautions     Pt states \"I don't like to be in locked facilities or people that  my way when attempting to leave\"     Routine Programming     As clinically indicated     Status 15     Every 15 minutes.     Plan:   Stop Abilify- patient had dystonic reaction  Add Cogentin, Ativan PRN  Start Seroquel 50 mg TID prn paranoia/hallucinations- reviewed that this has less propensity to cause dystonia. He is willing to try this today.   Would like Rule 25 completed- treatment  Voluntary      Rationale for change in precautions or plan: target psychosis      Participants: XIMENA Trotter, CNP,   Nursing, OT, SW, Dr Haywood  "

## 2022-04-19 PROCEDURE — 124N000001 HC R&B MH

## 2022-04-19 PROCEDURE — 99232 SBSQ HOSP IP/OBS MODERATE 35: CPT | Performed by: NURSE PRACTITIONER

## 2022-04-19 PROCEDURE — 250N000013 HC RX MED GY IP 250 OP 250 PS 637: Performed by: NURSE PRACTITIONER

## 2022-04-19 RX ORDER — QUETIAPINE FUMARATE 100 MG/1
100 TABLET, FILM COATED ORAL AT BEDTIME
Status: DISCONTINUED | OUTPATIENT
Start: 2022-04-19 | End: 2022-04-21

## 2022-04-19 RX ADMIN — NICOTINE POLACRILEX 2 MG: 2 GUM, CHEWING BUCCAL at 17:17

## 2022-04-19 RX ADMIN — QUETIAPINE FUMARATE 50 MG: 50 TABLET ORAL at 10:26

## 2022-04-19 RX ADMIN — NICOTINE POLACRILEX 2 MG: 2 GUM, CHEWING BUCCAL at 19:43

## 2022-04-19 RX ADMIN — QUETIAPINE 100 MG: 100 TABLET, FILM COATED ORAL at 20:51

## 2022-04-19 RX ADMIN — LORAZEPAM 1 MG: 1 TABLET ORAL at 20:51

## 2022-04-19 RX ADMIN — QUETIAPINE FUMARATE 50 MG: 50 TABLET ORAL at 16:00

## 2022-04-19 RX ADMIN — NICOTINE POLACRILEX 2 MG: 2 GUM, CHEWING BUCCAL at 12:13

## 2022-04-19 RX ADMIN — BENZTROPINE MESYLATE 1 MG: 1 TABLET ORAL at 20:51

## 2022-04-19 ASSESSMENT — ACTIVITIES OF DAILY LIVING (ADL)
DRESS: SCRUBS (BEHAVIORAL HEALTH);INDEPENDENT
LAUNDRY: UNABLE TO COMPLETE
HYGIENE/GROOMING: INDEPENDENT
ORAL_HYGIENE: INDEPENDENT

## 2022-04-19 NOTE — PLAN OF CARE
"  Problem: Behavioral Health Plan of Care  Goal: Patient-Specific Goal (Individualization)  Description: Pt. Will follow recommendations of treatment team during hospital stay.  Pt. Will sleep 6-8 hours per night during hospital stay.  Pt. Will attend > 50% of unit programing during hospital stay.   Pt. Will maintain ADLs without prompting during hospital stay.   Outcome: Ongoing, Not Progressing   Goal Outcome Evaluation:    Plan of Care Reviewed With: patient     Patient is alert, able to make needs known. VSS, afebrile. Assessment and vitals as charted.   Patient up in lounge most of shift, ambulating the hallway and in lounge area. He is somewhat more irritable today. PRN Seroquel given at 1026 for agitation and irritability. Some improvement noted upon reassessment, patient also verbalize a \"slight improvement. About 1200, patient reports he wants to go. Very irritable and paranoid of staff. States he is not sure if \"this place is real.\" Very suspicious of staff here and makes paranoid statements. Ask why we are taking his blood and \"throwing it everywhere.\" Believes we are going to sell his information to \"high intelligent areas.\" Attempted to give reassurance about staff being unable to legally share medical records. He does not believe this. Has been pacing the hallway often peering into nurses station, carefully watching staff. Persistent in leaving. Provider called and updated.     Face to face report given with opportunity to observe patient.    Report given to RUBEN Jim.     Tracie Calderon RN   4/19/2022  3:22 PM        "

## 2022-04-19 NOTE — PLAN OF CARE
Face to face end of shift report will be communicated to oncoming RN.    Problem: Behavioral Health Plan of Care  Goal: Patient-Specific Goal (Individualization)  Description: Pt. Will follow recommendations of treatment team during hospital stay.  Pt. Will sleep 6-8 hours per night during hospital stay.  Pt. Will attend > 50% of unit programing during hospital stay.   Pt. Will maintain ADLs without prompting during hospital stay.   Outcome: Ongoing, Progressing     Problem: Psychotic Symptoms  Goal: Psychotic Symptoms  Description: Signs and symptoms of listed problems will be absent or manageable.  Outcome: Ongoing, Progressing     Problem: Psychotic Symptoms  Goal: Social and Therapeutic (Psychotic Symptoms)  Description: Signs and symptoms of listed problems will be absent or manageable.  Outcome: Ongoing, Progressing   Goal Outcome Evaluation:  Face to face end of shift report obtained from RUBEN Jim. Pt observed resting in bed. Pt appears to be sleeping in bed with eyes closed, having regular respirations, and position changes. 15 minutes and PRN safety checks completed with no noted complains. No psychotic signs noted or reported so far this shift.     0600-Pt appeared to had slept all night.
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
## 2022-04-19 NOTE — PLAN OF CARE
"Problem: Psychotic Symptoms  Goal: Psychotic Symptoms  Description: Signs and symptoms of listed problems will be absent or manageable.  Outcome: Ongoing, Progressing    Pt was cleared some this shift though is still paranoid     Problem: Behavioral Health Plan of Care  Goal: Patient-Specific Goal (Individualization)  Description: Pt. Will follow recommendations of treatment team during hospital stay.  Pt. Will sleep 6-8 hours per night during hospital stay.  Pt. Will attend > 50% of unit programing during hospital stay.   Pt. Will maintain ADLs without prompting during hospital stay.   Outcome: Ongoing, Progressing   Goal Outcome Evaluation:    Plan of Care Reviewed With: patient     1530 Face to face rounding complete.  Pt introduced to nursing for the shift.    Pt was up and active all shift.  He attended some of the groups this evening and was able to participate more due to being more organized.  Pt did complain of some paranoid thoughts near the beginning of the shift and was given Seroquel 50 and reported good results.  When I prepared his medications for bedtime Pt said, \"What do you want from me?  What are you trying to do with me here?\"  I quietly and calmly explained to him that we want him to get better so that he can go on to live a healthy good life.  Pt softened some from his paranoia.  I offered him some Ativan 1 mg and Cogentin 0.5 to go with his HS Seroquel which he took after I gave the rationale for the PRN's.    2300 Face to face end of shift report communicated to Night Shift RN's along with Pt's fall risk.     Hernán Hernandez RN  4/19/2022                  "

## 2022-04-19 NOTE — PROGRESS NOTES
[facility]  Psychiatric Progress Note      Impression:    This is a 31 year old yo male with a history of unspecified psychosis, bipolar disorder, polysubstance abuse, and chronic hep C+ who was brought to the ED after he was found to be disorganized and confused at a gas station. Did admit to methamphetamine use a few days ago, utox only positive for THC. Today it is difficult to get much history from him as he is irritable and just wants to sleep. He does voice an interest in CD treatment. Denies taking any medications. During a previous hospital stay in 2019 he was on a stay of commitment and stabilized on Abilify and Gabapentin. Will attempt to discuss these medications with the patient tomorrow after he is able to sleep and may be more agreeable to speaking with me. Will see if he can have a Rule 25 completed while here.          Interim History:   Tani is in his room when I see him today. Yesterday he had declined to try the Seroquel, however this morning he did agree to take it. I did meet with him about an hour after taking this and he is a little bit clearer, appears less anxious and does not get tearful during our conversation. He denies that he is having any side effects with this, denies any muscle tightness. Will look at scheduling a dose of Seroquel tonight and increase dose as tolerated. Ammonia was checked yesterday and resulted as normal. He denies any hallucinations today. He states that he is sleeping well at night. Has been attending some of the group programming and spends time walking the halls.      Educated regarding medication indications, risks, benefits, side effects, contraindications and possible interactions. Verbally expressed understanding.        Diagnoses:   Unspecified psychosis  R/o substance induced psychosis  R/o bipolar 1 disorder (by history)  Stimulant (methamphetamine) use disorder, likely moderate to severe  Chronic hep C+      Attestation:  Patient has been  "seen and evaluated by me,  Shanice Turner NP      The patient's care was discussed with the treatment team and chart notes were reviewed.            Medications:     Current Facility-Administered Medications Ordered in Epic   Medication Dose Route Frequency Last Rate Last Admin     acetaminophen (TYLENOL) tablet 650 mg  650 mg Oral Q4H PRN         alum & mag hydroxide-simethicone (MAALOX) suspension 30 mL  30 mL Oral Q4H PRN         hydrOXYzine (ATARAX) tablet 50 mg  50 mg Oral Q4H PRN         melatonin tablet 3 mg  3 mg Oral At Bedtime PRN         nicotine (NICODERM CQ) 21 MG/24HR 24 hr patch 1 patch  1 patch Transdermal Daily         nicotine (NICORETTE) gum 2 mg  2 mg Buccal Q1H PRN   2 mg at 04/14/22 0706     nicotine Patch in Place   Transdermal Q8H         OLANZapine (zyPREXA) tablet 10 mg  10 mg Oral TID PRN   10 mg at 04/13/22 1757    Or     OLANZapine (zyPREXA) injection 10 mg  10 mg Intramuscular TID PRN         polyethylene glycol (MIRALAX) Packet 17 g  17 g Oral Daily PRN         No current [facility]-ordered outpatient medications on file.              10 point ROS- denies concerns today       Allergies:     Allergies   Allergen Reactions     Abilify [Aripiprazole]      Dystonic reaction            Psychiatric Examination:   /76 (BP Location: Left arm)   Pulse 64   Temp 99.3  F (37.4  C) (Temporal)   Resp 16   Ht 1.803 m (5' 11\")   Wt 71.8 kg (158 lb 6.4 oz)   SpO2 97%   BMI 22.09 kg/m    Weight is 158 lbs 6.4 oz  Body mass index is 22.09 kg/m .    Appearance:  Awake, alert, dressed in hospital scrubs, casually groomed  Attitude:  Cooperative, pleasant  Eye Contact:  good  Mood: \"fine\", denies depression, reports anxiety and worry  Affect: appears less anxious today and does not get tearful  Speech:  clear, coherent, some delay  Psychomotor Behavior:  no evidence of tardive dyskinesia, dystonia, or tics  Thought Process:  linear and goal oriented, illogical thoughts at times  Associations:  no " loose associations  Thought Content:  no evidence of suicidal ideation or homicidal ideation, denies hallucinations currently though continues to be paranoid  Insight:  limited  Judgment:  limited  Oriented to:  time, person, and place  Attention Span and Concentration:  fair  Recent and Remote Memory:  fair  Fund of Knowledge: appropriate for education  Muscle Strength and Tone: normal  Gait and Station: Normal           Labs:     Results for orders placed or performed during the hospital encounter of 04/13/22 (from the past 24 hour(s))   Ammonia   Result Value Ref Range    Ammonia 21 10 - 50 umol/L   Extra Tube    Narrative    The following orders were created for panel order Extra Tube.  Procedure                               Abnormality         Status                     ---------                               -----------         ------                     Extra Red Top Tube[[id number]]                               Final result               Extra Green Top (Lithium...[[id number]]                      Final result               Extra Purple Top Tube[[id number]]                            Final result                 Please view results for these tests on the individual orders.   Extra Red Top Tube   Result Value Ref Range    Hold Specimen JIC    Extra Green Top (Lithium Heparin) Tube   Result Value Ref Range    Hold Specimen JIC    Extra Purple Top Tube   Result Value Ref Range    Hold Specimen JIC          BEHAVIORAL TEAM DISCUSSION    Progress: Slow improvement. Less disorganized, though paranoia and delusions still present and have not improved off substances. Agreeable to Seroquel at bedtime.    Continued Stay Criteria/Rationale: monitor and treat symptoms of disorganization and psychosis. Abilify stopped d/t dystonia, monitor for further symptoms and look at starting Seroquel.     Medical/Physical: none  Precautions:   Falls precaution?: No  Behavioral Orders   Procedures     Code 1 - Restrict to Unit      Routine Programming     As clinically indicated     Status 15     Every 15 minutes.     Plan:   Stop Abilify- patient had dystonic reaction  Add Cogentin, Ativan PRN  Start Seroquel 50 mg TID prn paranoia/hallucinations- reviewed that this has less propensity to cause dystonia. He is willing to try this today.   Will add Seroquel 100 mg at bedtime (4/19)- reviewed risks, benefits, side effects. Denies any side effects today. Increase as tolerated  Would like Rule 25 completed- treatment  Voluntary      Rationale for change in precautions or plan: target psychosis      Participants: Shanice Turner, APRN, CNP,   Nursing, OT, SW, Dr Haywood

## 2022-04-19 NOTE — PROGRESS NOTES
"CLINICAL NUTRITION SERVICES  -  REASSESSMENT NOTE    Tani Ramirez     31 yom admitted for psychosis. Pt has a past medical hx including anxiety, ADD, polysubstance abuse. Limited weights to assess. Unable to determine if there has been any recent change. Weight down 5lbs since first measured weight. Measured weights could be inaccurate. Good appetite and intake.     Diet Order: Regular  Intake: 15 meals with % intake (mostly % intake)    Height: 5' 11\"  Weight: 158 lbs 6.4 oz  Body mass index is 22.09 kg/m .  Weight Status:  Normal BMI  Weight History: Admit weight- 74.1 kg (163 lb 6.4 oz)  Wt Readings from Last 10 Encounters:   04/16/22 71.8 kg (158 lb 6.4 oz)   05/09/19 87 kg (191 lb 12.8 oz)   04/21/19 80.7 kg (178 lb)      Malnutrition Diagnosis: Unable to determine due to limited data to assess weights.     NUTRITION RECOMMENDATIONS  - Encourage intake as needed     MONITORING AND EVALUATION:  RD will monitor intake, weight, labs     "

## 2022-04-20 PROCEDURE — 250N000013 HC RX MED GY IP 250 OP 250 PS 637: Performed by: NURSE PRACTITIONER

## 2022-04-20 PROCEDURE — 124N000001 HC R&B MH

## 2022-04-20 PROCEDURE — 99232 SBSQ HOSP IP/OBS MODERATE 35: CPT | Performed by: NURSE PRACTITIONER

## 2022-04-20 RX ADMIN — NICOTINE POLACRILEX 2 MG: 2 GUM, CHEWING BUCCAL at 10:27

## 2022-04-20 RX ADMIN — NICOTINE 1 PATCH: 21 PATCH, EXTENDED RELEASE TRANSDERMAL at 08:16

## 2022-04-20 RX ADMIN — QUETIAPINE FUMARATE 50 MG: 50 TABLET ORAL at 08:16

## 2022-04-20 RX ADMIN — QUETIAPINE 100 MG: 100 TABLET, FILM COATED ORAL at 20:25

## 2022-04-20 ASSESSMENT — ACTIVITIES OF DAILY LIVING (ADL)
HYGIENE/GROOMING: INDEPENDENT
DRESS: INDEPENDENT
LAUNDRY: UNABLE TO COMPLETE
ORAL_HYGIENE: INDEPENDENT

## 2022-04-20 NOTE — PROGRESS NOTES
"[facility]  Psychiatric Progress Note      Impression:    This is a 31 year old yo male with a history of unspecified psychosis, bipolar disorder, polysubstance abuse, and chronic hep C+ who was brought to the ED after he was found to be disorganized and confused at a gas station. Did admit to methamphetamine use a few days ago, utox only positive for THC. Today it is difficult to get much history from him as he is irritable and just wants to sleep. He does voice an interest in CD treatment. Denies taking any medications. During a previous hospital stay in 2019 he was on a stay of commitment and stabilized on Abilify and Gabapentin. Will attempt to discuss these medications with the patient tomorrow after he is able to sleep and may be more agreeable to speaking with me. Will see if he can have a Rule 25 completed while here.          Interim History:   This is the first time I have Tani. Staff tell me they have noticed signifiant improvement since admission. He seems to have some insight into his mental health and methamphetminue use. He states he wants to get sober and go to treatment. He states he has a son that he \"wants to get better for\". He does become tangential at times though catches himself and is able to get back on topic. He states when he \"gets like this it makes it hard to communicate with others'. He denies suicidal thoughts though has a history of an attempt. He states he believes his mother has bipolar disorder. He states he was diagnosed with bipolar disorder and believes he has this. He does have odd associates and states \"when I get manic I think too much and I think everything goes together or has a reason. I think too in depth\". He states he slept better last night. Has no hallucinations though talks about having hallucinations at home that occur only when he is drifting off ot sleep. He will have very vivid visual hallucinations and ringing in his ears though states only when he " "is falling asleep.      Educated regarding medication indications, risks, benefits, side effects, contraindications and possible interactions. Verbally expressed understanding.        Diagnoses:   Unspecified psychosis  R/o substance induced psychosis  R/o bipolar 1 disorder (by history)  Stimulant (methamphetamine) use disorder, likely moderate to severe  Chronic hep C+      Attestation:  Patient has been seen and evaluated by me,  Cathie De La Cruz NP      The patient's care was discussed with the treatment team and chart notes were reviewed.            Medications:     Current Facility-Administered Medications Ordered in Epic   Medication Dose Route Frequency Last Rate Last Admin     acetaminophen (TYLENOL) tablet 650 mg  650 mg Oral Q4H PRN         alum & mag hydroxide-simethicone (MAALOX) suspension 30 mL  30 mL Oral Q4H PRN         hydrOXYzine (ATARAX) tablet 50 mg  50 mg Oral Q4H PRN         melatonin tablet 3 mg  3 mg Oral At Bedtime PRN         nicotine (NICODERM CQ) 21 MG/24HR 24 hr patch 1 patch  1 patch Transdermal Daily         nicotine (NICORETTE) gum 2 mg  2 mg Buccal Q1H PRN   2 mg at 04/14/22 0706     nicotine Patch in Place   Transdermal Q8H         OLANZapine (zyPREXA) tablet 10 mg  10 mg Oral TID PRN   10 mg at 04/13/22 1757    Or     OLANZapine (zyPREXA) injection 10 mg  10 mg Intramuscular TID PRN         polyethylene glycol (MIRALAX) Packet 17 g  17 g Oral Daily PRN         No current [facility]-ordered outpatient medications on file.              10 point ROS- denies concerns today       Allergies:     Allergies   Allergen Reactions     Abilify [Aripiprazole]      Dystonic reaction            Psychiatric Examination:   /86   Pulse 86   Temp 98.8  F (37.1  C) (Tympanic)   Resp 16   Ht 1.803 m (5' 11\")   Wt 75 kg (165 lb 6.4 oz)   SpO2 96%   BMI 23.07 kg/m    Weight is 165 lbs 6.4 oz  Body mass index is 23.07 kg/m .    Appearance:  Awake, alert, dressed in hospital scrubs, " "casually groomed  Attitude:  Cooperative, pleasant  Eye Contact:  good  Mood: \"fine\", denies depression, reports anxiety and worry  Affect: appears less anxious today and does not get tearful  Speech:  clear, coherent, some delay  Psychomotor Behavior:  no evidence of tardive dyskinesia, dystonia, or tics  Thought Process:  linear and goal oriented, illogical thoughts at times  Associations:  no loose associations  Thought Content:  no evidence of suicidal ideation or homicidal ideation, denies hallucinations currently though continues to be paranoid  Insight:  limited  Judgment:  limited  Oriented to:  time, person, and place  Attention Span and Concentration:  fair  Recent and Remote Memory:  fair  Fund of Knowledge: appropriate for education  Muscle Strength and Tone: normal  Gait and Station: Normal           Labs:     No results found for this or any previous visit (from the past 24 hour(s)).      BEHAVIORAL TEAM DISCUSSION    Progress: Slow improvement. Less disorganized, though paranoia and delusions still present and have not improved off substances. Agreeable to Seroquel at bedtime.    Continued Stay Criteria/Rationale: monitor and treat symptoms of disorganization and psychosis. Abilify stopped d/t dystonia, monitor for further symptoms and look at starting Seroquel.     Medical/Physical: none  Precautions:   Falls precaution?: No  Behavioral Orders   Procedures     Code 1 - Restrict to Unit     Routine Programming     As clinically indicated     Status 15     Every 15 minutes.     Plan:        continue HS Seroquel. Will likely increase tomorrow though did recently have dystonic reaction and will wait to increase.    Continue Cogentin, Ativan PRN  Start Seroquel 50 mg TID prn paranoia/hallucinations- reviewed that this has less propensity to cause dystonia. He is willing to try this today.   Continue. Seroquel 100 mg at bedtime (4/19)- reviewed risks, benefits, side effects. Denies any side effects today. " Increase as tolerated  Will have rule 25 completed here   Voluntary      Rationale for change in precautions or plan: target psychosis      Participants: Cathie ibarra CNP APRN, CNP,   Nursing, OT, SW, Dr Haywood

## 2022-04-20 NOTE — PLAN OF CARE
Face to face end of shift report will be communicated to oncoming RN.     Problem: Behavioral Health Plan of Care  Goal: Patient-Specific Goal (Individualization)  Description: Pt. Will follow recommendations of treatment team during hospital stay.  Pt. Will sleep 6-8 hours per night during hospital stay.  Pt. Will attend > 50% of unit programing during hospital stay.   Pt. Will maintain ADLs without prompting during hospital stay.   Outcome: Ongoing, Progressing     Problem: Psychotic Symptoms  Goal: Social and Therapeutic (Psychotic Symptoms)  Description: Signs and symptoms of listed problems will be absent or manageable.  Outcome: Ongoing, Progressing   Goal Outcome Evaluation:  Face to face end of shift report obtained from RUBEN Jim. Pt observed resting in bed. Pt appears to be sleeping in bed with eyes closed, having regular respirations, and position changes. 15 minutes and PRN safety checks completed with no noted complains. No psychotic signs noted or reported so far this shift.     0600-Pt appeared to had slept all night.

## 2022-04-20 NOTE — PLAN OF CARE
"Face to face end of shift report received from Jesús HAGAN RN. Rounding completed. Patient observed in [facility].     Pt has been calm, cooperative this shift. He expressed feeling anxious this morning. Administered Seroquel 50 mg at 0816. PT denied SI/HI and AH/VH. Denies pain. He doesn't appear to be as confused as yesterday. He is able to have a linear conversation with this writer. When asked if he knows where he is he states \"God please let it be [place]\" and laughed about it. Pt hasn't been to groups this morning. He is withdrawn, but will engage when spoken to . He paces the hallway. Behaviors have been appropriate. He states that he feels the Seroquel really helped this morning and that he is feeling \"Much better. I am working on my goals today\". Pt appears very tired- when asked if he was tired he stated he was \"I'm not sure if I am getting good sleep here or if I am just laying there with my eyes shut\". Pt was given Voluntary rights and MAHI to sign (he was unable to sign upon admission due to confusion/disorganized behavior). Numbers placed in treatment team sticky notes. Pt is able to make needs known. Frequent rounding.     Problem: Behavioral Health Plan of Care  Goal: Patient-Specific Goal (Individualization)  Description: Pt. Will follow recommendations of treatment team during hospital stay.  Pt. Will sleep 6-8 hours per night during hospital stay.  Pt. Will attend > 50% of unit programing during hospital stay.   Pt. Will maintain ADLs without prompting during hospital stay.   Outcome: Ongoing, Progressing     Problem: Psychotic Symptoms  Goal: Psychotic Symptoms  Description: Signs and symptoms of listed problems will be absent or manageable.  Outcome: Ongoing, Progressing       Dara Purvis RN  4/20/2022  10:15 AM    "

## 2022-04-20 NOTE — PLAN OF CARE
Spoke with pt this morning, pt was found in group. Pt is less delusional and paranoid today. Pt states he contacted his parents and child and still would like to go to treatment. Pt was able to have a linear conversation. Reminded pt that the [facility] would be back Monday to complete his rule 25.

## 2022-04-21 PROCEDURE — 250N000013 HC RX MED GY IP 250 OP 250 PS 637: Performed by: NURSE PRACTITIONER

## 2022-04-21 PROCEDURE — U0005 INFEC AGEN DETEC AMPLI PROBE: HCPCS | Performed by: NURSE PRACTITIONER

## 2022-04-21 PROCEDURE — 124N000001 HC R&B MH

## 2022-04-21 PROCEDURE — 99232 SBSQ HOSP IP/OBS MODERATE 35: CPT | Performed by: NURSE PRACTITIONER

## 2022-04-21 RX ORDER — QUETIAPINE FUMARATE 100 MG/1
100 TABLET, FILM COATED ORAL
Status: DISCONTINUED | OUTPATIENT
Start: 2022-04-21 | End: 2022-04-22 | Stop reason: HOSPADM

## 2022-04-21 RX ORDER — LORAZEPAM 0.5 MG/1
0.5 TABLET ORAL 2 TIMES DAILY
Status: DISCONTINUED | OUTPATIENT
Start: 2022-04-21 | End: 2022-04-22 | Stop reason: HOSPADM

## 2022-04-21 RX ORDER — LORAZEPAM 1 MG/1
1 TABLET ORAL 2 TIMES DAILY
Status: DISCONTINUED | OUTPATIENT
Start: 2022-04-21 | End: 2022-04-21

## 2022-04-21 RX ORDER — LITHIUM CARBONATE 300 MG/1
300 TABLET, FILM COATED, EXTENDED RELEASE ORAL AT BEDTIME
Status: DISCONTINUED | OUTPATIENT
Start: 2022-04-21 | End: 2022-04-22 | Stop reason: HOSPADM

## 2022-04-21 RX ADMIN — LITHIUM CARBONATE 300 MG: 300 TABLET, EXTENDED RELEASE ORAL at 20:17

## 2022-04-21 RX ADMIN — LORAZEPAM 0.5 MG: 0.5 TABLET ORAL at 15:03

## 2022-04-21 RX ADMIN — GABAPENTIN 300 MG: 300 CAPSULE ORAL at 08:01

## 2022-04-21 RX ADMIN — NICOTINE POLACRILEX 2 MG: 2 GUM, CHEWING BUCCAL at 18:04

## 2022-04-21 RX ADMIN — NICOTINE POLACRILEX 2 MG: 2 GUM, CHEWING BUCCAL at 08:02

## 2022-04-21 RX ADMIN — NICOTINE 1 PATCH: 21 PATCH, EXTENDED RELEASE TRANSDERMAL at 08:01

## 2022-04-21 RX ADMIN — LORAZEPAM 1 MG: 1 TABLET ORAL at 09:39

## 2022-04-21 RX ADMIN — NICOTINE POLACRILEX 2 MG: 2 GUM, CHEWING BUCCAL at 06:24

## 2022-04-21 RX ADMIN — NICOTINE POLACRILEX 2 MG: 2 GUM, CHEWING BUCCAL at 20:17

## 2022-04-21 ASSESSMENT — ACTIVITIES OF DAILY LIVING (ADL)
LAUNDRY: UNABLE TO COMPLETE
DRESS: INDEPENDENT
HYGIENE/GROOMING: INDEPENDENT
ORAL_HYGIENE: INDEPENDENT
DRESS: INDEPENDENT
ORAL_HYGIENE: INDEPENDENT
HYGIENE/GROOMING: INDEPENDENT
LAUNDRY: UNABLE TO COMPLETE

## 2022-04-21 NOTE — PROGRESS NOTES
"[facility]  Psychiatric Progress Note      Impression:    This is a 31 year old yo male with a history of unspecified psychosis, bipolar disorder, polysubstance abuse, and chronic hep C+ who was brought to the ED after he was found to be disorganized and confused at a gas station. Did admit to methamphetamine use a few days ago, utox only positive for THC. Today it is difficult to get much history from him as he is irritable and just wants to sleep. He does voice an interest in CD treatment. Denies taking any medications. During a previous hospital stay in 2019 he was on a stay of commitment and stabilized on Abilify and Gabapentin. Will attempt to discuss these medications with the patient tomorrow after he is able to sleep and may be more agreeable to speaking with me. Will see if he can have a Rule 25 completed while here.          Interim History:   Notes indicate yohannes slept well last night though he states his sleep was veyr fragmented.Yesterday we filled out fmla papers and faxed them to employment. He states he has not had any hallucinations since he has been here. Staff that worked with him last night also noted significant improvement in his thought process. This morning he appears confused. His affect appears confused and almost lost. He has a difficult time getting words out. He improved significnatly after taking morning ativan. He filled out mood disorder questionnaire and answered yes to every question. I reviewed old recrods from when he was inpatoent at [facility] and diagnosed with bipolar type I. He is more often in manic states though states \"I then get deflated and dont want to do anything\".         Educated regarding medication indications, risks, benefits, side effects, contraindications and possible interactions. Verbally expressed understanding.        Diagnoses:   Unspecified psychosis  R/o substance induced psychosis  R/o bipolar 1 disorder (by history)  Stimulant " "(methamphetamine) use disorder, likely moderate to severe  Chronic hep C+      Attestation:  Patient has been seen and evaluated by me,  Cathie De La Cruz NP      The patient's care was discussed with the treatment team and chart notes were reviewed.            Medications:     Current Facility-Administered Medications Ordered in Epic   Medication Dose Route Frequency Last Rate Last Admin     acetaminophen (TYLENOL) tablet 650 mg  650 mg Oral Q4H PRN         alum & mag hydroxide-simethicone (MAALOX) suspension 30 mL  30 mL Oral Q4H PRN         hydrOXYzine (ATARAX) tablet 50 mg  50 mg Oral Q4H PRN         melatonin tablet 3 mg  3 mg Oral At Bedtime PRN         nicotine (NICODERM CQ) 21 MG/24HR 24 hr patch 1 patch  1 patch Transdermal Daily         nicotine (NICORETTE) gum 2 mg  2 mg Buccal Q1H PRN   2 mg at 04/14/22 0706     nicotine Patch in Place   Transdermal Q8H         OLANZapine (zyPREXA) tablet 10 mg  10 mg Oral TID PRN   10 mg at 04/13/22 1757    Or     OLANZapine (zyPREXA) injection 10 mg  10 mg Intramuscular TID PRN         polyethylene glycol (MIRALAX) Packet 17 g  17 g Oral Daily PRN         No current [facility]-ordered outpatient medications on file.              10 point ROS- denies concerns today       Allergies:     Allergies   Allergen Reactions     Abilify [Aripiprazole]      Dystonic reaction            Psychiatric Examination:   /81 (BP Location: Right arm)   Pulse 70   Temp 97.5  F (36.4  C) (Temporal)   Resp 16   Ht 1.803 m (5' 11\")   Wt 75 kg (165 lb 6.4 oz)   SpO2 97%   BMI 23.07 kg/m    Weight is 165 lbs 6.4 oz  Body mass index is 23.07 kg/m .    Appearance:  Awake, alert, dressed in hospital scrubs, casually groomed  Attitude:  Cooperative, pleasant  Eye Contact:  good  Mood: \"fine\", denies depression, reports anxiety and worry  Affect: very odd and confused this am prior to ativan   Speech:  clear, coherent, some delay  Psychomotor Behavior:  no evidence of tardive " dyskinesia, dystonia, or tics  Thought Process:  linear and goal oriented, illogical thoughts at times  Associations:  no loose associations  Thought Content:  no evidence of suicidal ideation or homicidal ideation, denies hallucinations currently though continues to be paranoid  Insight:  limited  Judgment:  limited  Oriented to:  time, person, and place  Attention Span and Concentration:  fair  Recent and Remote Memory:  fair  Fund of Knowledge: appropriate for education  Muscle Strength and Tone: normal  Gait and Station: Normal           Labs:     No results found for this or any previous visit (from the past 24 hour(s)).      BEHAVIORAL TEAM DISCUSSION    Progress: improving.     Continued Stay Criteria/Rationale: high risk of relapse and recidivism. He should go door to door to treatment.   Medical/Physical: none  Precautions: none  Falls precaution?: No  Behavioral Orders   Procedures     Code 1 - Restrict to Unit     Routine Programming     As clinically indicated     Status 15     Every 15 minutes.     Plan:          -Ativan 0.5 mg bid scheduled. Taper once lithium therapietuc and symptoms improved. may not be able to go to treatment on this.     - start lithium 300 mg hs and titrate up.         -Will need rule 25. Likely Monday.         Rationale for change in precautions or plan: will see if possibly discharge to crisis center and waiting for treatment is an option though if it is not, he should not return home prior to treatment as he has no strong sober support      Participants: Cathie ibarra CNP APRN, CNP,   Nursing, OT, SW, Dr Haywood

## 2022-04-21 NOTE — PLAN OF CARE
Problem: Psychotic Symptoms  Goal: Psychotic Symptoms  Description: Signs and symptoms of listed problems will be absent or manageable.  Outcome: Ongoing, Progressing     Pt was clear and oriented this shift.    Problem: Behavioral Health Plan of Care  Goal: Patient-Specific Goal (Individualization)  Description: Pt. Will follow recommendations of treatment team during hospital stay.  Pt. Will sleep 6-8 hours per night during hospital stay.  Pt. Will attend > 50% of unit programing during hospital stay.   Pt. Will maintain ADLs without prompting during hospital stay.   Outcome: Ongoing, Progressing  Flowsheets (Taken 4/20/2022 2148)  Patient Vulnerabilities: substance abuse/addiction  Patient Personal Strengths:    positive vocational history    socioeconomic stability   Goal Outcome Evaluation:    1530 Face to face rounding complete.  Pt introduced to nursing for the shift.    Pt was up and active all shift and attended all of the groups.  Pt is clear, logical, and organized this evening. He talked to me about what happened since he was at the gas station and is hoping to find out where his car is at. He made calls to his girlfriend and to the 's office from his county. He is struggling to make sense of the last few days. He was given some teaching on the Seroquel that he has been taking. He is interested in going to MI/CD treatment and wanted information on [facility] programs. He was given some handouts from their website.  Pt is looking forward to getting his rule 25 as soon as possible.    2300 Face to face end of shift report communicated to Night Shift RN's along with Pt's fall risk.     Hernán Hernandez RN  4/20/2022  9:53 PM

## 2022-04-21 NOTE — PLAN OF CARE
"Face to face end of shift report received from Teressa FAUSTIN RN. Rounding completed. [place] [facility]     Pt is very disorganized, confused this shift again. He is not able to have a linear conversation with this writer. He started asking about test results and upset \"I am in a hospital I should have all these results read to me\". Questioned if pt was having any anxiety and if he would like anything for it. \"Well I have Generalized Anxiety Disorder. I should be scheduled something and taking it regularly for my anxiety\". Administered Gabapentin 300 mg. Pt started on Ativan BID- brought this to the pt and he stated \"Oh great, more fucking meds\". Pt given education handout on Ativan. He was hyper-focused on the side effects portion. Reminded patient that he has had Ativan a couple of times while here and he has never reported any of the side effects listed. Pt appeared relieved by this and willingly took it. Pt stayed in his room laughing to himself for about a half hour after administration. Pt did go to group. This writer went into group to observe patient's behavior afterwards. Pt is having linear conversation with MHP regarding substance abuse and cutting ties with the friends that are currently using. Pt behavior is calm. He does not pause and look off into space during this time. Pt still has some paranoia. Asked how pt was doing a short while after group and with a big smile he stated \"I'm great, thank you for asking\". Pt napped for some of the afternoon. He doesn't seem as confused/disoriented as compared to this morning. Frequent rounding.     Pt comes to the nurse station stating \"I think you have to give me some medication right now. April prescribed it\". Informed him that it is due at 1600, but can be passed at 1500. Administered at 1504 without any hesitation from pt.      Problem: Behavioral Health Plan of Care  Goal: Patient-Specific Goal (Individualization)  Description: Pt. Will follow " recommendations of treatment team during hospital stay.  Pt. Will sleep 6-8 hours per night during hospital stay.  Pt. Will attend > 50% of unit programing during hospital stay.   Pt. Will maintain ADLs without prompting during hospital stay.   Outcome: Ongoing, Progressing     Problem: Psychotic Symptoms  Goal: Psychotic Symptoms  Description: Signs and symptoms of listed problems will be absent or manageable.  Outcome: Ongoing, Progressing     Problem: Psychotic Symptoms  Goal: Social and Therapeutic (Psychotic Symptoms)  Description: Signs and symptoms of listed problems will be absent or manageable.  Outcome: Ongoing, Progressing       Dara Purvis RN  4/21/2022  10:09 AM

## 2022-04-21 NOTE — PLAN OF CARE
SHIFT SUMMARY:  Calm and cooperative. AOx4. Able to have a reality-based, linear conversation. Full-range affect. Denies SI/HI, anxiety, depression, auditory or visual hallucinations and pain. Focused on getting his car out of impound, since it is collecting fees daily. Sociable with peers and staff in the lounge.      Problem: Behavioral Health Plan of Care  Goal: Patient-Specific Goal (Individualization)  Description: Pt. Will follow recommendations of treatment team during hospital stay.  Pt. Will sleep 6-8 hours per night during hospital stay.  Pt. Will attend > 50% of unit programing during hospital stay.   Pt. Will maintain ADLs without prompting during hospital stay.   Outcome: Ongoing, Progressing  Goal: Absence of New-Onset Illness or Injury  Outcome: Ongoing, Progressing     Problem: Psychotic Symptoms  Goal: Psychotic Symptoms  Description: Signs and symptoms of listed problems will be absent or manageable.  Outcome: Ongoing, Progressing   Goal Outcome Evaluation:

## 2022-04-22 VITALS
HEIGHT: 71 IN | WEIGHT: 165.4 LBS | HEART RATE: 72 BPM | RESPIRATION RATE: 16 BRPM | DIASTOLIC BLOOD PRESSURE: 68 MMHG | TEMPERATURE: 98 F | OXYGEN SATURATION: 97 % | BODY MASS INDEX: 23.15 KG/M2 | SYSTOLIC BLOOD PRESSURE: 126 MMHG

## 2022-04-22 LAB — SARS-COV-2 RNA RESP QL NAA+PROBE: NEGATIVE

## 2022-04-22 PROCEDURE — 250N000013 HC RX MED GY IP 250 OP 250 PS 637: Performed by: NURSE PRACTITIONER

## 2022-04-22 PROCEDURE — 99239 HOSP IP/OBS DSCHRG MGMT >30: CPT | Performed by: NURSE PRACTITIONER

## 2022-04-22 RX ORDER — LORAZEPAM 0.5 MG/1
0.5 TABLET ORAL 2 TIMES DAILY
Qty: 60 TABLET | Refills: 0 | Status: SHIPPED | OUTPATIENT
Start: 2022-04-22

## 2022-04-22 RX ORDER — QUETIAPINE FUMARATE 50 MG/1
TABLET, FILM COATED ORAL
Qty: 90 TABLET | Refills: 0 | Status: SHIPPED | OUTPATIENT
Start: 2022-04-22

## 2022-04-22 RX ORDER — LANOLIN ALCOHOL/MO/W.PET/CERES
3 CREAM (GRAM) TOPICAL
COMMUNITY
Start: 2022-04-22

## 2022-04-22 RX ORDER — LITHIUM CARBONATE 300 MG/1
300 TABLET, FILM COATED, EXTENDED RELEASE ORAL AT BEDTIME
Qty: 30 TABLET | Refills: 0 | Status: SHIPPED | OUTPATIENT
Start: 2022-04-22

## 2022-04-22 RX ADMIN — NICOTINE POLACRILEX 2 MG: 2 GUM, CHEWING BUCCAL at 12:41

## 2022-04-22 RX ADMIN — LORAZEPAM 0.5 MG: 0.5 TABLET ORAL at 08:34

## 2022-04-22 ASSESSMENT — ACTIVITIES OF DAILY LIVING (ADL): HYGIENE/GROOMING: INDEPENDENT

## 2022-04-22 NOTE — PLAN OF CARE
Face to face report received from Carl MIRANDA. Pt. Observed.     Problem: Behavioral Health Plan of Care  Goal: Patient-Specific Goal (Individualization)  Description: Pt. Will follow recommendations of treatment team during hospital stay.  Pt. Will sleep 6-8 hours per night during hospital stay.  Pt. Will attend > 50% of unit programing during hospital stay.   Pt. Will maintain ADLs without prompting during hospital stay.   Outcome: Ongoing, Progressing  Note: Pt has been in bed with eyes closed and regular respirations x 7 hours. Pt. Requesting/administered snack x 1 this noc shift. 15 minute and PRN checks all night. No complaints offered. Will continue to monitor.     Face to face end of shift report to be communicated to oncoming RN.     Marguerite Lemus RN  4/22/2022

## 2022-04-22 NOTE — PLAN OF CARE
"  Problem: Behavioral Health Plan of Care  Goal: Patient-Specific Goal (Individualization)  Description: Pt. Will follow recommendations of treatment team during hospital stay.  Pt. Will sleep 6-8 hours per night during hospital stay.  Pt. Will attend > 50% of unit programing during hospital stay.   Pt. Will maintain ADLs without prompting during hospital stay.   Outcome: Ongoing, Not Progressing     Problem: Psychotic Symptoms  Goal: Psychotic Symptoms  Description: Signs and symptoms of listed problems will be absent or manageable.  Outcome: Ongoing, Not Progressing     Problem: Suicidal Behavior  Goal: Suicidal Behavior is Absent or Managed  Outcome: Ongoing, Progressing   Goal Outcome Evaluation:      Pt restless/pacing  and disorganized with no aggressive behavior. Conversation illogical and garbled. Appears tired and did  say that she\" has no sleep\" during assessment. Wearing clothing inside out and backwards- taking her pants off stating \"they are wet\". Able to dress herself with prompting into clean scrubs. Room untidy with food and debris all over the floor and out into the hallway. Spills water often. Medicated with Thorazine 25 mg po at 0811 with effectiveness. Laid down for around 45 minutes and appeared to be sleeping.elopement precautions maintained. Remains on covid precautions. Pt saf from self harm and or falls.    Face to face end of shift report communicated to RUBEN Pate RN  4/22/2022  11:31 AM                     "

## 2022-04-22 NOTE — DISCHARGE SUMMARY
"Psychiatric Discharge Summary    Tani Ramirez MRN# [id number]   Age: 31 year old YOB: 1990     Date of Admission:  4/13/2022  Date of Discharge:  4/22/2022  Admitting Provider:  Ventura Haywood DO  Discharging Provider:  XIMENA Burton CNP          Event Leading to Hospitalization and Hospital Stay     Tani Ramirez is a 31 year old male who was brought to the ED after being found confused at a gas station. Has a past history of polysubstance abuse including alcohol, stimulants, and cannabis as well as unspecified mood disorder, psychosis, and provisional bipolar 1 disorder. Patient was at a gas station for approximately a half an hour before police were called having difficulties with his debit card and how the [employer] works. He was barefoot and did not have a shirt on. Patient reported methamphetamine use a few days prior. Was noted in ED to have delusions, tangential speech, and thought blocking in the ED. Last inpatient stay was in 2019. Patient was medically cleared and transferred to behavioral health for further evaluation.     Hospital course: Patient was started on Ativan, lithium, and Seroquel, which appeared to help clear his thoughts, improve his sleep, and reduce his anxiety.    I was notified by nursing that he was requesting to sign a 12-hour intent to discharge and I was able to assess him personally. This is the first time I've ever met Tani, though I did a thorough chart review before our interview. He was able to articulate that he was concerned about impound fees adding up and stated that he didn't want to lose his car. He shares that the current fee to get it out of impound is $600 and they will add an additional $35/day each day it sits there. Patient denies suicidal or homicidal ideation and any hallucinations or delusions. The patient denies any issues with sleep or appetite. He admits that he considers meth to be \"just stronger Adderall\" and it's never been a " "problem for him before. He also states he smoked some marijuana called \"Cheetah Piss\" and worries that may have contributed to his symptoms. I did educate him on the relationship between meth/stimulant abuse and psychosis as well as the propensity for those with a predisposition towards psychotic disorders to develop symptoms of psychosis after using synthetics or marijuana with high THC concentrations.     I did ask him what his plan was going forward and he states he would like to \"tie up some loose ends before treatment.\" He specifically mentions getting his car out of impound and cleaning his parent's basement. He states he has been in contact with his girlfriend who has agreed to help him get back to his vehicle. He has expressed interest in going to [facility] once he gets everything squared away. He is grateful for assistance with his [facility] paperwork and indicates he will find out on Tuesday if his claim was accepted. He is aware that I will only be providing him with a month's worth of medications and knows he will need to establish care with an outpatient provider for refills. He also knows that he will be responsible for setting up and attending a Comprehensive Assessment in order to access chemical dependency treatment. I did encourage him to call the unit with any questions or concerns that may arise post-discharge and he agrees to do so.    A 30-day supply of medications were sent to [facility] in [place] per patient's request.          Patient will be transported to [address] [place] [address] in [place] at 3:20 pm by [employer].     At time of discharge, there is no evidence that patient is in immediate danger of self or others.          Diagnoses:     Unspecified psychosis  R/o substance induced psychosis  R/o bipolar 1 disorder (by history)  Stimulant (methamphetamine) use disorder, likely moderate to severe  Chronic hep C+         Labs:   No results found for this or any previous visit (from the " past 24 hour(s)).           Discharge Medications:        Review of your medicines      START taking      Dose / Directions   lithium  MG CR tablet  Commonly known as: LITHOBID      Dose: 300 mg  Take 1 tablet (300 mg) by mouth At Bedtime  Quantity: 30 tablet  Refills: 0     LORazepam 0.5 MG tablet  Commonly known as: ATIVAN      Dose: 0.5 mg  Take 1 tablet (0.5 mg) by mouth 2 times daily  Quantity: 60 tablet  Refills: 0     melatonin 3 MG tablet  Used for: Sleep disturbance      Dose: 3 mg  Take 1 tablet (3 mg) by mouth nightly as needed for sleep  Refills: 0     QUEtiapine 50 MG tablet  Commonly known as: SEROquel      Take 50 mg up to three times daily as needed for hallucinations or anxiety. Can take 100 mg before bed for sleep.  Quantity: 90 tablet  Refills: 0        CONTINUE these medicines which have NOT CHANGED      Dose / Directions   fish oil-omega-3 fatty acids 1000 MG capsule      Dose: 1 g  Take 1 g by mouth daily  Refills: 0     VITAMIN D3 PO      Dose: 1 tablet  Take 1 tablet by mouth daily  Refills: 0           Where to get your medicines      These medications were sent to [facility] [id number] [place] [address] [address]    Phone: [phone]     lithium  MG CR tablet    LORazepam 0.5 MG tablet    QUEtiapine 50 MG tablet     Some of these will need a paper prescription and others can be bought over the counter. Ask your nurse if you have questions.    You don't need a prescription for these medications    melatonin 3 MG tablet              Psychiatric Examination:     Appearance:  awake, alert, adequately groomed, and dressed in hospital scrubs  Attitude:  cooperative  Eye Contact:  good  Mood:  good  Affect:  appropriate and in normal range  Speech:  clear, coherent  Psychomotor Behavior:  no evidence of tardive dyskinesia, dystonia, or tics  Thought Process:  logical, linear, and goal oriented  Associations:  no loose  associations  Thought Content:  no evidence of suicidal ideation or homicidal ideation and no evidence of psychotic thought  Insight:  fair  Judgment:  fair  Oriented to:  time, person, and place  Attention Span and Concentration:  fair  Recent and Remote Memory:  fair  Fund of Knowledge: appropriate  Muscle Strength and Tone: normal  Gait and Station: Normal         Discharge Plan:     -Continue lithium 300 mg before bed. OP provider to optimize  -Continue Ativan 0.5 mg BID. OP provider to taper as lithium dose is optimized  -Continue Seroquel 50 mg TID PRN for hallucinations/anxiety or 100 mg at bedtime for sleep      As a result, Tani Ramirez was discharged to home. At the time of discharge, Tani Ramirez was determined to not be a danger to self or others. At the current time of discharge, the patient does not meet criteria for involuntary hospitalization. On the day of discharge, the patient reports that they do not have suicidal or homicidal ideation. Steps taken to minimize risk include: assessing patient s behavior and thought process daily during hospital stay, discharging patient with adequate plan for follow up for mental and physical health and discussing safety plan of returning to the hospital should the patient ever have thoughts of harming themselves or others. Therefore, based on all available evidence including the factors cited above, the patient does not appear to be at imminent risk for self-harm, and is appropriate for outpatient level of care. However, if patient uses substances or is medication non-adherent, their risk of decompensation and SI will be elevated. This was discussed with the patient.    Take all medications as prescribed  Abstain from using any illicit drugs or alcohol  Attend all follow-up appointments as scheduled     The patient was given information for local crisis services and was encouraged to contact the [facility] with any questions or concerns that might arise  post-discharge.     Attestation:  The patient has been seen and evaluated by me,  XIMENA Burton CNP         Discharge Services Provided:   45   minutes spent on discharge services, including:  Final examination of patient.  Review and discussion of hospital stay.  Instructions for continued outpatient care/goals.  Preparation of discharge records.  Preparation of medications refills and new prescriptions.  Preparation of applicable referral forms.

## 2022-04-22 NOTE — PROGRESS NOTES
"CLINICAL NUTRITION SERVICES  -  REASSESSMENT NOTE    Tani Ramirez    31 yom admitted for psychosis. Pt has a past medical hx including anxiety, ADD, polysubstance abuse. Limited weights to assess. Appetite has been good, intake has been adequate. Weight up 2lbs since admission.     Diet Order: Regular  Intake: 9 meals with 100% intake    Height: 5' 11\"  Weight: 165 lbs 6.4 oz  Body mass index is 23.07 kg/m .  Weight Status:  Normal BMI  Weight History: Admit weight- 74.1 kg (163 lb 6.4 oz)  Wt Readings from Last 10 Encounters:   04/20/22 75 kg (165 lb 6.4 oz)   05/09/19 87 kg (191 lb 12.8 oz)   04/21/19 80.7 kg (178 lb)      Malnutrition Diagnosis: Unable to determine due to limited data to assess weights.     NUTRITION RECOMMENDATIONS  - Encourage intake as needed     MONITORING AND EVALUATION:  RD will monitor as needed    "

## 2022-04-22 NOTE — PLAN OF CARE
"  Problem: Behavioral Health Plan of Care  Goal: Patient-Specific Goal (Individualization)  Description: Pt. Will follow recommendations of treatment team during hospital stay.  Pt. Will sleep 6-8 hours per night during hospital stay.  Pt. Will attend > 50% of unit programing during hospital stay.   Pt. Will maintain ADLs without prompting during hospital stay.   4/22/2022 1135 by Patience Pate RN  Outcome: Ongoing, Progressing  4/22/2022 1116 by Patience Pate RN  Outcome: Ongoing, Not Progressing     Problem: Suicidal Behavior  Goal: Suicidal Behavior is Absent or Managed  4/22/2022 1135 by Patience Pate RN  Outcome: Ongoing, Progressing  4/22/2022 1116 by Patience Pate RN  Outcome: Ongoing, Progressing   Goal Outcome Evaluation:      Pt pleasant and cooperative during assessment. Maintains good eye contact. Polite with writer. Conversation clear and precise. Denies hallucinations or suicidal thoughts. Feels he has improved since admission. Affect bright. Socializes with staff and peers. No abnormal statements or behavior. Brii HOLLIDAY aware that pt requested a 12 hour intent to leave- pt doesn't want to \"just hang around- I'm worried about getting my car- it was towed away it's 35$ daily in the pound\". No other requests or complaints    Face to face end of shift report communicated to RUBEN Pate RN  4/22/2022  12:02 PM            Discharge Note    Patient Discharged to home on 4/22/2022 3:08 PM [employer] accompanied by [facility].     Patient informed of discharge instructions in AVS. patient verbalizes understanding and denies having any questions pertaining to AVS. Patient stable at time of discharge. Patient denies SI, HI, and thoughts of self harm at time of discharge. All personal belongings returned to patient. Discharge prescriptions sent to Pharmacy via electronic communication. Psych evaluation, history and physical, AVS, and discharge summary faxed to next level of care- . "     Patience Pate, RN  4/22/2022  3:08 PM

## 2022-09-28 NOTE — PROGRESS NOTES
At approximately 1945 pt came to evening group and displayed delusional, tangential and paranoid thought processes with loose associations being unable to form or complete a story that could make any sense, he spoke of krishna and his son being trafficked and the  being fake and his doctor here is really a  and many other delusions, this was a 180 from morning and early afternoon groups were he displayed highly intelligent thought processes and ability to process group material with reality based and logical conversation.    Melolabial Transposition Flap Text: The defect edges were debeveled with a #15 scalpel blade.  Given the location of the defect and the proximity to free margins a melolabial flap was deemed most appropriate.  Using a sterile surgical marker, an appropriate melolabial transposition flap was drawn incorporating the defect.    The area thus outlined was incised deep to adipose tissue with a #15 scalpel blade.  The skin margins were undermined to an appropriate distance in all directions utilizing iris scissors.

## 2025-01-30 NOTE — PROGRESS NOTES
"[facility]  Psychiatric Progress Note      Impression:    This is a 31 year old yo male with a history of unspecified psychosis, bipolar disorder, polysubstance abuse, and chronic hep C+ who was brought to the ED after he was found to be disorganized and confused at a gas station. Did admit to methamphetamine use a few days ago, utox only positive for THC. Today it is difficult to get much history from him as he is irritable and just wants to sleep. He does voice an interest in CD treatment. Denies taking any medications. During a previous hospital stay in 2019 he was on a stay of commitment and stabilized on Abilify and Gabapentin. Will attempt to discuss these medications with the patient tomorrow after he is able to sleep and may be more agreeable to speaking with me. Will see if he can have a Rule 25 completed while here.          Interim History:   Tani is in his room when I see him today. He appears sad and is tearful. He asks \"do you know where my parents are?\" He then elaborates, stating that he thinks that they accepted \"some kind of deal\" and are now out of the country. He then asks if I know whether he has any brothers or sisters, then talks about meeting clones of himself. He does remain paranoid today. It appears in review of notes that he had a better evening yesterday, had attended group programming and was more logical. He was able to identify that Abilify in the past made him feel \"tense in the thighs\". Did add this to his allergy list due to dystonic reaction. Does appear he is sleeping well at night. Was going to wait a day or two before considering adding a different neuroleptic to see if paranoia would clear on its own. Will look into something with less potential for dystonia.    Educated regarding medication indications, risks, benefits, side effects, contraindications and possible interactions. Verbally expressed understanding.        Diagnoses:   Unspecified psychosis  R/o " "substance induced psychosis  R/o bipolar 1 disorder (by history)  Stimulant (methamphetamine) use disorder, likely moderate to severe  Chronic hep C+      Attestation:  Patient has been seen and evaluated by me,  Shanice Turner NP      The patient's care was discussed with the treatment team and chart notes were reviewed.            Medications:     Current Facility-Administered Medications Ordered in Epic   Medication Dose Route Frequency Last Rate Last Admin     acetaminophen (TYLENOL) tablet 650 mg  650 mg Oral Q4H PRN         alum & mag hydroxide-simethicone (MAALOX) suspension 30 mL  30 mL Oral Q4H PRN         hydrOXYzine (ATARAX) tablet 50 mg  50 mg Oral Q4H PRN         melatonin tablet 3 mg  3 mg Oral At Bedtime PRN         nicotine (NICODERM CQ) 21 MG/24HR 24 hr patch 1 patch  1 patch Transdermal Daily         nicotine (NICORETTE) gum 2 mg  2 mg Buccal Q1H PRN   2 mg at 04/14/22 0706     nicotine Patch in Place   Transdermal Q8H         OLANZapine (zyPREXA) tablet 10 mg  10 mg Oral TID PRN   10 mg at 04/13/22 1757    Or     OLANZapine (zyPREXA) injection 10 mg  10 mg Intramuscular TID PRN         polyethylene glycol (MIRALAX) Packet 17 g  17 g Oral Daily PRN         No current [facility]-ordered outpatient medications on file.              10 point ROS- denies concerns today       Allergies:     Allergies   Allergen Reactions     Abilify [Aripiprazole]      Dystonic reaction            Psychiatric Examination:   /70   Pulse 99   Temp 99.1  F (37.3  C) (Temporal)   Resp 16   Ht 1.803 m (5' 11\")   Wt 71.8 kg (158 lb 6.4 oz)   SpO2 96%   BMI 22.09 kg/m    Weight is 158 lbs 6.4 oz  Body mass index is 22.09 kg/m .    Appearance:  Awake, alert, dressed in hospital scrubs, casually groomed  Attitude:  Cooperative, pleasant  Eye Contact:  good  Mood:  anxious  Affect:  mood congruent, tearful  Speech:  clear, coherent, some delay  Psychomotor Behavior:  no evidence of tardive dyskinesia, dystonia, or " "tics  Thought Process:  linear and goal oriented, illogical thoughts at times  Associations:  no loose associations  Thought Content:  no evidence of suicidal ideation or homicidal ideation, denies hallucinations currently though continues to be paranoid  Insight:  limited  Judgment:  limited  Oriented to:  time, person, and place  Attention Span and Concentration:  fair  Recent and Remote Memory:  fair  Fund of Knowledge: appropriate for education  Muscle Strength and Tone: normal  Gait and Station: Normal           Labs:   No results found for this or any previous visit (from the past 24 hour(s)).      BEHAVIORAL TEAM DISCUSSION    Progress: Slow improvement. Less disorganized today, some thought blocking evident. Paranoid thoughts remain.    Continued Stay Criteria/Rationale: monitor and treat symptoms of disorganization and psychosis. Abilify stopped d/t dystonia, monitor for further symptoms and look at starting a different neuroleptic    Medical/Physical: none  Precautions:   Falls precaution?: No  Behavioral Orders   Procedures     Code 1 - Restrict to Unit     Elopement precautions     Pt states \"I don't like to be in locked facilities or people that  my way when attempting to leave\"     Routine Programming     As clinically indicated     Status 15     Every 15 minutes.     Plan:   Stop Abilify- patient had dystonic reaction  Add Cogentin, Ativan PRN  Will monitor psychosis off neuroleptic for now to see if any clearing as presentation may be substance induced  Would like Rule 25 completed- treatment  Voluntary        Rationale for change in precautions or plan: target psychosis      Participants: XIMENA Trotter, CNP    Nursing  " 1.53